# Patient Record
Sex: MALE | Race: BLACK OR AFRICAN AMERICAN | Employment: OTHER | URBAN - METROPOLITAN AREA
[De-identification: names, ages, dates, MRNs, and addresses within clinical notes are randomized per-mention and may not be internally consistent; named-entity substitution may affect disease eponyms.]

---

## 2019-02-26 ENCOUNTER — HOSPITAL ENCOUNTER (OUTPATIENT)
Dept: PREADMISSION TESTING | Age: 69
Discharge: HOME | End: 2019-02-26
Attending: INTERNAL MEDICINE
Payer: MEDICARE

## 2019-02-26 LAB
ANION GAP SERPL CALC-SCNC: 7 MMOL/L (ref 3–18)
ATRIAL RATE: 65 BPM
BUN SERPL-MCNC: 19 MG/DL (ref 7–18)
BUN/CREAT SERPL: 13 (ref 12–20)
CALCIUM SERPL-MCNC: 8.7 MG/DL (ref 8.5–10.1)
CALCULATED P AXIS, ECG09: 48 DEGREES
CALCULATED R AXIS, ECG10: 19 DEGREES
CALCULATED T AXIS, ECG11: 53 DEGREES
CHLORIDE SERPL-SCNC: 109 MMOL/L (ref 100–108)
CO2 SERPL-SCNC: 25 MMOL/L (ref 21–32)
CREAT SERPL-MCNC: 1.5 MG/DL (ref 0.6–1.3)
DIAGNOSIS, 93000: NORMAL
EST. AVERAGE GLUCOSE BLD GHB EST-MCNC: 146 MG/DL
GLUCOSE SERPL-MCNC: 168 MG/DL (ref 74–99)
HBA1C MFR BLD: 6.7 % (ref 4.2–5.6)
HCT VFR BLD AUTO: 37.2 % (ref 36–48)
HGB BLD-MCNC: 12.3 G/DL (ref 13–16)
P-R INTERVAL, ECG05: 198 MS
POTASSIUM SERPL-SCNC: 4.1 MMOL/L (ref 3.5–5.5)
Q-T INTERVAL, ECG07: 406 MS
QRS DURATION, ECG06: 98 MS
QTC CALCULATION (BEZET), ECG08: 422 MS
SODIUM SERPL-SCNC: 141 MMOL/L (ref 136–145)
VENTRICULAR RATE, ECG03: 65 BPM

## 2019-02-26 PROCEDURE — 85018 HEMOGLOBIN: CPT

## 2019-02-26 PROCEDURE — 93005 ELECTROCARDIOGRAM TRACING: CPT

## 2019-02-26 PROCEDURE — 83036 HEMOGLOBIN GLYCOSYLATED A1C: CPT

## 2019-02-26 PROCEDURE — 36415 COLL VENOUS BLD VENIPUNCTURE: CPT

## 2019-02-26 PROCEDURE — 80048 BASIC METABOLIC PNL TOTAL CA: CPT

## 2019-02-28 ENCOUNTER — HOSPITAL ENCOUNTER (OUTPATIENT)
Age: 69
Setting detail: OUTPATIENT SURGERY
Discharge: HOME | End: 2019-02-28
Attending: INTERNAL MEDICINE | Admitting: INTERNAL MEDICINE
Payer: MEDICARE

## 2019-02-28 ENCOUNTER — ANESTHESIA EVENT (OUTPATIENT)
Dept: ENDOSCOPY | Age: 69
End: 2019-02-28
Payer: MEDICARE

## 2019-02-28 ENCOUNTER — ANESTHESIA (OUTPATIENT)
Dept: ENDOSCOPY | Age: 69
End: 2019-02-28
Payer: MEDICARE

## 2019-02-28 VITALS
DIASTOLIC BLOOD PRESSURE: 87 MMHG | HEIGHT: 69 IN | BODY MASS INDEX: 37.22 KG/M2 | OXYGEN SATURATION: 100 % | SYSTOLIC BLOOD PRESSURE: 164 MMHG | TEMPERATURE: 100.2 F | HEART RATE: 81 BPM | WEIGHT: 251.31 LBS | RESPIRATION RATE: 15 BRPM

## 2019-02-28 LAB — GLUCOSE BLD STRIP.AUTO-MCNC: 152 MG/DL (ref 70–110)

## 2019-02-28 PROCEDURE — 88305 TISSUE EXAM BY PATHOLOGIST: CPT

## 2019-02-28 PROCEDURE — 77030020263 HC SOL INJ SOD CL0.9% LFCR 1000ML: Performed by: INTERNAL MEDICINE

## 2019-02-28 PROCEDURE — 76060000031 HC ANESTHESIA FIRST 0.5 HR: Performed by: INTERNAL MEDICINE

## 2019-02-28 PROCEDURE — 77030013991 HC SNR POLYP ENDOSC BSC -A: Performed by: INTERNAL MEDICINE

## 2019-02-28 PROCEDURE — 76040000019: Performed by: INTERNAL MEDICINE

## 2019-02-28 PROCEDURE — 82962 GLUCOSE BLOOD TEST: CPT

## 2019-02-28 PROCEDURE — 74011250636 HC RX REV CODE- 250/636

## 2019-02-28 PROCEDURE — 74011250636 HC RX REV CODE- 250/636: Performed by: INTERNAL MEDICINE

## 2019-02-28 PROCEDURE — 77030009426 HC FCPS BIOP ENDOSC BSC -B: Performed by: INTERNAL MEDICINE

## 2019-02-28 RX ORDER — SODIUM CHLORIDE 0.9 % (FLUSH) 0.9 %
5-40 SYRINGE (ML) INJECTION AS NEEDED
Status: CANCELLED | OUTPATIENT
Start: 2019-02-28

## 2019-02-28 RX ORDER — PROPOFOL 10 MG/ML
INJECTION, EMULSION INTRAVENOUS AS NEEDED
Status: DISCONTINUED | OUTPATIENT
Start: 2019-02-28 | End: 2019-02-28 | Stop reason: HOSPADM

## 2019-02-28 RX ORDER — SODIUM CHLORIDE 9 MG/ML
125 INJECTION, SOLUTION INTRAVENOUS CONTINUOUS
Status: DISCONTINUED | OUTPATIENT
Start: 2019-02-28 | End: 2019-02-28 | Stop reason: HOSPADM

## 2019-02-28 RX ORDER — FLUMAZENIL 0.1 MG/ML
0.2 INJECTION INTRAVENOUS
Status: CANCELLED | OUTPATIENT
Start: 2019-02-28 | End: 2019-02-28

## 2019-02-28 RX ORDER — ATROPINE SULFATE 0.1 MG/ML
0.5 INJECTION INTRAVENOUS
Status: CANCELLED | OUTPATIENT
Start: 2019-02-28 | End: 2019-03-01

## 2019-02-28 RX ORDER — SODIUM CHLORIDE 0.9 % (FLUSH) 0.9 %
5-40 SYRINGE (ML) INJECTION EVERY 8 HOURS
Status: CANCELLED | OUTPATIENT
Start: 2019-02-28

## 2019-02-28 RX ORDER — DEXTROMETHORPHAN/PSEUDOEPHED 2.5-7.5/.8
1.2 DROPS ORAL
Status: CANCELLED | OUTPATIENT
Start: 2019-02-28

## 2019-02-28 RX ORDER — EPINEPHRINE 0.1 MG/ML
1 INJECTION INTRACARDIAC; INTRAVENOUS
Status: CANCELLED | OUTPATIENT
Start: 2019-02-28 | End: 2019-03-01

## 2019-02-28 RX ORDER — NALOXONE HYDROCHLORIDE 0.4 MG/ML
0.4 INJECTION, SOLUTION INTRAMUSCULAR; INTRAVENOUS; SUBCUTANEOUS
Status: CANCELLED | OUTPATIENT
Start: 2019-02-28 | End: 2019-02-28

## 2019-02-28 RX ADMIN — PROPOFOL 20 MG: 10 INJECTION, EMULSION INTRAVENOUS at 15:45

## 2019-02-28 RX ADMIN — PROPOFOL 20 MG: 10 INJECTION, EMULSION INTRAVENOUS at 15:47

## 2019-02-28 RX ADMIN — PROPOFOL 20 MG: 10 INJECTION, EMULSION INTRAVENOUS at 15:44

## 2019-02-28 RX ADMIN — PROPOFOL 20 MG: 10 INJECTION, EMULSION INTRAVENOUS at 15:53

## 2019-02-28 RX ADMIN — PROPOFOL 20 MG: 10 INJECTION, EMULSION INTRAVENOUS at 15:48

## 2019-02-28 RX ADMIN — PROPOFOL 20 MG: 10 INJECTION, EMULSION INTRAVENOUS at 15:51

## 2019-02-28 RX ADMIN — PROPOFOL 80 MG: 10 INJECTION, EMULSION INTRAVENOUS at 15:43

## 2019-02-28 RX ADMIN — SODIUM CHLORIDE 125 ML/HR: 900 INJECTION, SOLUTION INTRAVENOUS at 15:02

## 2019-02-28 NOTE — ANESTHESIA POSTPROCEDURE EVALUATION
Post-Anesthesia Evaluation and Assessment Cardiovascular Function/Vital Signs Visit Vitals /87 Pulse 81 Temp 37.9 °C (100.2 °F) Resp 15 Ht 5' 9\" (1.753 m) Wt 114 kg (251 lb 5 oz) SpO2 100% BMI 37.11 kg/m² Patient is status post Procedure(s): 
COLONOSCOPY; POLYPECTOMY. Nausea/Vomiting: Controlled. Postoperative hydration reviewed and adequate. Pain: 
Pain Scale 1: Numeric (0 - 10) (02/28/19 1618) Pain Intensity 1: 0 (02/28/19 1618) Managed. Neurological Status: At baseline. Mental Status and Level of Consciousness: Arousable. Pulmonary Status:  
O2 Device: Room air (02/28/19 1618) Adequate oxygenation and airway patent. Complications related to anesthesia: None Post-anesthesia assessment completed. No concerns. Patient has met all discharge requirements. Signed By: Hanna Richmond MD  
 February 28, 2019

## 2019-02-28 NOTE — H&P
Gastroenterology [place] [id number] [facility]  [address] Tel: [phone] Fax: [phone] Patient:  Yusra Tovar YOB: 1950 Date:                       2019 10:50 AM  
Historian:                  self Visit Type:                 Office Visit This 77year old male presents for Colon Cancer Screening. History of Present Illness: 1. Colon Cancer Screening Prior screening:  colonoscopy and 2009 Dr. Jose Antonio Paul 0 polyps. Denies risk factors. Additional information: No family history of colon cancer, Patient has a family history of Crohn's/colitis and NSAID/ASA use. PROBLEM LIST:   Problem List reviewed. Problem Description Onset Date Chronic Clinical Status Notes BMI 45.0-49.9, adult 2016 Y    
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    
 
 
 
 
 
PAST MEDICAL/SURGICAL HISTORY   (Detailed) Disease/disorder Onset Date Management Date Comments Shunt 2009 Shunt 1984 Penial Implant Knee surgery [address] Surgery Sinus Sugery Family History  (Detailed) Relationship Family Member Name  Age at Death Condition Onset Age Cause of Death Family history of Diabetes mellitus  N Family history of Hypertension  N Father  Y 80 Cardiovascular disease  N Mother  Y 50 Cardiovascular disease  N Social History:  (Detailed) Tobacco use reviewed. The patient is right-handed. Preferred language is [place]. Language spoken at home is [place]. MARITAL STATUS/FAMILY/SOCIAL SUPPORT Currently . Tobacco use status: Ex-cigarette smoker. Smoking status: Former smoker. SMOKING STATUS Use Status Type Smoking Status Usage Per Day Years Used Total Pack Years yes Cigarette Former smoker CESSATION Type Date Quit Longest Tobacco Free Cessation Method Cigarette 01/01/1994 TOBACCO/VAPING EXPOSURE No passive smoke exposure. ALCOHOL There is no history of alcohol use. Medications (active prior to today) Medication Instructions Start Date Stop Date Refilled Elsewhere  
insulin lispro protamine-lispro 100 unit/mL (50-50) subcutaneous pen inject by subcutaneous route as per insulin sliding scale protocol // 02/04/2019  Y Nexium 40 mg capsule,delayed release take 1 capsule by oral route  every day // 02/04/2019  Y Lasix 40 mg tablet take 1 tablet by oral route  Mon, Wed & Fri // 02/04/2019  Y  
hydralazine 50 mg tablet take 1 tablet by oral route 2 times every day with food // 02/04/2019  Y Lipitor 40 mg tablet take 1 tablet by oral route  every day // 02/04/2019  Y Novolog 100 unit/mL subcutaneous solution inject by subcutaneous route 8-10 units before meals // 02/04/2019  Y  
lisinopril 40 mg tablet take 1 tablet by oral route  every day //   Y Fergon 240 mg (27 mg iron) tablet  // 02/04/2019  Y Valium 5 mg tablet take 1 tablet half hour prior to MRI and another one if needed 06/17/2016 02/04/2019  N Centrum Complete 18 mg-400 mcg tablet take 1 tablet by oral route  every day with food // 02/04/2019  Y  
iron  //   [facility] Lantus Solostar 100 unit/mL (3 mL) subcutaneous insulin pen inject by subcutaneous route as per insulin protocol //   Y  
tamsulosin 0.4 mg capsule take 1 capsule by oral route  every day 1/2 hour following the same meal each day //   Y  
spironolactone 25 mg tablet take 1/2 tablet by oral route  every day // 02/04/2019  Y  
isosorbide mononitrate ER 60 mg tablet,extended release 24 hr take 1 tablet by oral route  every day in the morning //   Y  
atorvastatin 40 mg tablet take 1 tablet by oral route  every day // 02/04/2019  Y  
hydralazine 25 mg tablet take 1 tablet by oral route 2 times every day with food // 02/04/2019  Y  
butalbital-acetaminophen-caffeine 50 mg-325 mg-40 mg tablet take 1 - 2 tablet by oral route  every 8 hours as needed not to exceed 6 tablets per 24hrs 03/01/2018 02/04/2019 03/01/2018 N  
propranolol 20 mg tablet take 1 tablet by oral route 3 times every day 04/16/2018 02/04/2019 04/16/2018 N  
gabapentin 300 mg capsule TAKE ONE CAPSULE BY MOUTH TWICE DAILY 05/10/2018  05/10/2018 N  
TOPIRAMATE 50MG     TAB TAKE 3 TABLETS BY MOUTH TWICE DAILY 01/03/2019 02/04/2019 02/04/2019 N Trokendi XR 50 mg capsule, extended release take 1 capsule by oral route  every day 02/03/2019   N Aspirin Low Dose 81 mg tablet,delayed release take 1 tablet by oral route  every day 02/03/2019   N Lantus U-100 Insulin 100 unit/mL subcutaneous solution inject by subcutaneous route as per insulin protocol 02/03/2019   N Patient Status Completed with information received for patient in a summary of care record. Medication Reconciliation Medications reconciled today. Medication Reviewed Adherence Medication Name Sig Desc Elsewhere Status  
taking as directed lisinopril 40 mg tablet take 1 tablet by oral route  every day Y Verified  
taking as directed iron  Y Verified  
taking as directed Lantus Solostar 100 unit/mL (3 mL) subcutaneous insulin pen inject by subcutaneous route as per insulin protocol Y Verified  
taking as directed tamsulosin 0.4 mg capsule take 1 capsule by oral route  every day 1/2 hour following the same meal each day Y Verified  
taking as directed isosorbide mononitrate ER 60 mg tablet,extended release 24 hr take 1 tablet by oral route  every day in the morning Y Verified  
taking as directed gabapentin 300 mg capsule TAKE ONE CAPSULE BY MOUTH TWICE DAILY N Verified  
taking as directed GaviLyte-N 420 gram oral solution take as prescribed by physician N Verified  
taking as directed Trokendi XR 50 mg capsule, extended release take 1 capsule by oral route  every day N Verified taking as directed Aspirin Low Dose 81 mg tablet,delayed release take 1 tablet by oral route  every day N Verified  
taking as directed Lantus U-100 Insulin 100 unit/mL subcutaneous solution inject by subcutaneous route as per insulin protocol N Verified Medications (Added, Continued or Stopped today) Start Date Medication Directions PRN Status PRN Reason Instruction Stop Date  
02/03/2019 Aspirin Low Dose 81 mg tablet,delayed release take 1 tablet by oral route  every day N     
 atorvastatin 40 mg tablet take 1 tablet by oral route  every day N   02/04/2019 03/01/2018 butalbital-acetaminophen-caffeine 50 mg-325 mg-40 mg tablet take 1 - 2 tablet by oral route  every 8 hours as needed not to exceed 6 tablets per 24hrs N   02/04/2019 Centrum Complete 18 mg-400 mcg tablet take 1 tablet by oral route  every day with food N   02/04/2019 Fergon 240 mg (27 mg iron) tablet  N   02/04/2019  
05/10/2018 gabapentin 300 mg capsule TAKE ONE CAPSULE BY MOUTH TWICE DAILY N     
02/04/2019 GaviLyte-N 420 gram oral solution take as prescribed by physician N     
 hydralazine 25 mg tablet take 1 tablet by oral route 2 times every day with food N   02/04/2019  
 hydralazine 50 mg tablet take 1 tablet by oral route 2 times every day with food N   02/04/2019  
 insulin lispro protamine-lispro 100 unit/mL (50-50) subcutaneous pen inject by subcutaneous route as per insulin sliding scale protocol N   02/04/2019  
 iron  N     
 isosorbide mononitrate ER 60 mg tablet,extended release 24 hr take 1 tablet by oral route  every day in the morning N Lantus Solostar 100 unit/mL (3 mL) subcutaneous insulin pen inject by subcutaneous route as per insulin protocol N     
02/03/2019 Lantus U-100 Insulin 100 unit/mL subcutaneous solution inject by subcutaneous route as per insulin protocol N Lasix 40 mg tablet take 1 tablet by oral route  Mon, Wed & Fri N   02/04/2019 Lipitor 40 mg tablet take 1 tablet by oral route  every day N   02/04/2019  
 lisinopril 40 mg tablet take 1 tablet by oral route  every day N Nexium 40 mg capsule,delayed release take 1 capsule by oral route  every day N   02/04/2019 Novolog 100 unit/mL subcutaneous solution inject by subcutaneous route 8-10 units before meals N   02/04/2019 04/16/2018 propranolol 20 mg tablet take 1 tablet by oral route 3 times every day N   02/04/2019  
 spironolactone 25 mg tablet take 1/2 tablet by oral route  every day N   02/04/2019  
 tamsulosin 0.4 mg capsule take 1 capsule by oral route  every day 1/2 hour following the same meal each day N     
01/03/2019 TOPIRAMATE 50MG     TAB TAKE 3 TABLETS BY MOUTH TWICE DAILY N   02/04/2019 02/04/2019 TOPIRAMATE 50MG     TAB take 3 Tablet by oral route 2 times every day N   02/04/2019 02/03/2019 Trokendi XR 50 mg capsule, extended release take 1 capsule by oral route  every day N     
06/17/2016 Valium 5 mg tablet take 1 tablet half hour prior to MRI and another one if needed N  Do not drive while taking medication 02/04/2019 Allergies: 
Ingredient Reaction (Severity) Medication Name Comment NO KNOWN ALLERGIES     
 
ORDERS: 
Status Lab Order Time Frame Comments  
ordered GASTROENTEROLOGY PROCEDURE within 1 Month at location [address] surgeon scheduled is Mehreen Jimenez MD. An assistant has not been requested. gavilyte. Review of Systems System Neg/Pos Details Constitutional Negative Chills, Fever, Malaise and Weight loss. ENMT Positive Sinus Infection. ENMT Negative Nasal congestion and Sore throat. Eyes Negative Double vision. Respiratory Negative Asthma, Chronic cough and Wheezing. Cardio Negative Chest pain, Edema and Irregular heartbeat/palpitations. GI Positive Change in bowel habits, Constipation.   
GI Negative Abdominal pain, Decreased appetite, Diarrhea, Dysphagia, Heartburn, Hematemesis, Hematochezia, Melena, Nausea, Reflux and Vomiting.  Negative Dysuria and Hematuria. Endocrine Negative Cold intolerance, Heat intolerance and Increased thirst.  
Neuro Negative Dizziness, Headache, Numbness, Tremors and Vertigo. Psych Negative Anxiety, Depression and Increased stress. Integumentary Negative Hives and Rash. MS Positive Back pain. MS Negative Joint pain and Myalgia. Hema/Lymph Negative Easy bleeding and Lymphadenopathy. Allergic/Immuno Negative Food allergies and Seasonal allergies. Vital Signs Height Time ft in cm Last Measured Height Position 11:46 AM 5.0 9.00 175.26 02/04/2019 0 Weight/BSA/BMI Time lb oz kg Context BMI kg/m2 BSA m2  
11:46 .00  115.212 dressed with shoes 37.51 Blood Pressure Time BP mm/Hg Position Side Site Method Cuff Size 11:46 /66 sitting right arm automatic adult large Temperature/Pulse/Respiration Time Temp F Temp C Temp Site Pulse/min Pattern Resp/ min 11:46 AM    74 regular 20 Measured By Time Measured by  
11:46 AM [facility] PHYSICAL EXAM: 
Exam Findings Details Constitutional Normal Well developed. Eyes Normal Conjunctiva - Right: Normal, Left: Normal. Sclera - Right: Normal, Left: Normal.  
Nasopharynx Normal Lips/teeth/gums - Normal. Buccal mucosa - Normal.  
Neck Exam Normal Inspection - Normal. Palpation - Normal. Thyroid gland - Normal.  
Respiratory Normal Inspection - Normal. Auscultation - Normal.  
Cardiovascular Normal Regular rate and rhythm. No murmurs, gallops, or rubs. Vascular Normal Pulses - Radial: Normal, Brachial: Normal, Dorsalis pedis: Normal, Posterior tibial: Normal.  
Abdomen Normal Inspection - Normal. Appliance(s) - None. Abdominal muscles - Normal. Auscultation - Normal. Percussion - Normal. Anterior palpation - Normal, No guarding. Umbilicus - Normal. No abdominal tenderness.  No hepatic enlargement. No spleen enlargement. No hernia. No ascites. Ahuja's sign - Negative. No hepatic tenderness. No hepatic bruit. Skin Normal Inspection - Normal.  
Musculoskeletal Normal Hands/Wrist - Right: Normal, Left: Normal.  
Extremity Normal No edema. Neurological Normal Fine motor skills - Normal.  
Psychiatric Normal Orientation - Oriented to time, place, person & situation. Appropriate mood and affect. Assessment/Plan # Detail Type Description 1. Assessment Personal history of colonic polyps (Z86.010). Patient Plan 77year old male patient of Dr. Danielle Cabrera for colonoscopy. Last colonoscopy completed 2009 by Dr. Negro Amezquita and pathology revealed no polyps. BM once daily. Normal color, soft, formed in consistency. No evidence of blood or mucus, changes in bowel pattern issues. Pt reports constipation that is relieved with Linzess as needed. Patient reports seasonal allergies or herbal consumption. Medical hx includes hx of CVA, TIA, heart murmur, HTN, BPH, hyperlipidemia. DMII. No significant  pulmonary, GI,  musculoskeletal,  issues. Surgical hx remarkable gastric bypass. No family history of colorectal CA. Denies tobacco and reports rare ETOH use. No significant weight gains or losses in the last 3-6 months. No heat or cold intolerances. Patient states  no N/V/D, fever, chills, sick contacts, SOB, abdominal or chest pains. No dysphagia, appetite is good which consists of 3 meals per day. PLAN: Colonoscopy Scheduled He has average risk for colon cancer and asymptomatic. He would be having his screening colonoscopy. I explained to him the procedure of colonoscopy and the risks involved which include but not limited to reaction to sedation, bleeding, perforation, infection or missing a lesion if bowels are not well prepped or are unusually tortuous. He agreed to proceed with the procedure and answered his questions. thoroughly.   I gave him the Gavilyte to clean his bowels. I advised him to take if needed, extra laxatives for few days before in the event he is on the constipated side to assure adequate bowel prep. Told him not take his medications in the morning of the procedure because they would be flushed out with the prep but he can take them more confidently after the procedure. I advised him to bring all his medication with him. I advised pt to not to take hypoglycemic agents/ Insuline the morning of the procedure to avoid hypoglycemic reaction because fasting is needed and check BS at least twice prior to procedure. Plan Orders He will be scheduled for GASTROENTEROLOGY PROCEDURE, Next Lab Date is within 1 Month on 02/28/2019. Patient Education # Patient Education 1. Colon Polyps: Care Instructions Active Patient Care Team Members Name Contact Agency Type Support Role Relationship Active Date Inactive Date Specialty Froylan Gillespie    Spouse Froylan Gillespie   Emergency Contact Spouse Dawson Bravo   encounter provider Quita Weeks   Patient provider PCP Juany Toledo   encounter provider    Neurology Provider:  
Sonia Kelley  02/04/2019 3:18 PM  
Document generated by: Kaye Kovacs 02/04/2019 Electronically signed by Kaye Kovacs [id number]-BC on 02/04/2019 03:18 PM 
 
I have seen and examined the patient.

## 2019-02-28 NOTE — PROCEDURES
Colonoscopy Procedure Note    Indications: colon cancer screening    Anesthesia/Sedation: MAC anesthesia Propofol    Pre-Procedure Exam:  Airway: clear   Heart: normal S1and S2    Lungs: clear bilateral  Abdomen: soft, nontender, bowel sounds present and normal in all quadrants   Mental Status: awake, alert, and oriented to person, place, and time      Procedure in Detail:  Informed consent was obtained for the procedure, including sedation. Risks of perforation, hemorrhage, adverse drug reaction, and aspiration were discussed. The patient was placed in the left lateral decubitus position. Based on the pre-procedure assessment, including review of the patient's medical history, medications, allergies, and review of systems, he had been deemed to be an appropriate candidate for moderate sedation; he was therefore sedated with the medications listed above. The patient was monitored continuously with ECG tracing, pulse oximetry, blood pressure monitoring, and direct observations. A rectal examination was performed. The [id number] was inserted into the rectum and advanced under direct vision to the cecum, which was identified by the appendiceal orifice. The quality of the colonic preparation was good. A careful inspection was made as the colonoscope was withdrawn, including a retroflexed view of the rectum; findings and interventions are described below. Appropriate photodocumentation was obtained. ANUS: Anal exam reveals no masses. Small internal and external hemorrhoids. RECTUM: Rectal exam reveals no masses. SIGMOID COLON: The mucosa is normal with good vascular pattern and without ulcers and polyps. Diverticulosis -mild  DESCENDING COLON: The mucosa is normal with good vascular pattern and without ulcers and polyps. Diverticulosis -mild  SPLENIC FLEXURE: The splenic flexure is normal.   TRANSVERSE COLON: The mucosa is normal with good vascular pattern and without ulcers, diverticula.    Small 5mm polyp removed with cold snare. HEPATIC FLEXURE: The hepatic flexure is normal.   ASCENDING COLON: The mucosa is normal with good vascular pattern and without ulcers, diverticula, and polyps. CECUM: The appendiceal orifice appears normal. The ileocecal valve appears normal. Single diverticula   TERMINAL ILEUM: The terminal ileum was not entered. Specimens: No specimens were collected. EBL: None    Withdrawal Time: 13 min    Complications: None; patient tolerated the procedure well. Attending Attestation: I performed the procedure. Recommendations:   - Await pathology.     Signed By: Mary Armijo MD                      2/28/2019

## 2019-02-28 NOTE — ANESTHESIA PREPROCEDURE EVALUATION
Anesthetic History Pertinent negatives: No PONV Review of Systems / Medical History Patient summary reviewed, nursing notes reviewed and pertinent labs reviewed Pulmonary Sleep apnea (non-compliant with CPAP): No treatment Pertinent negatives: No smoker (former smoker, quit 2007) Neuro/Psych  
 
 
CVA (2009; balance problems and speech, resolved with time and physical and speech therapy, no residual deficits): no residual symptoms TIA (2012; no focal symptoms, just acting wierd, resolved with ASA in the ER, no recurrence) Cardiovascular Hypertension (did not take meds today): well controlled Pertinent negatives: No past MI, dysrhythmias, angina and CHF 
 
  
GI/Hepatic/Renal 
  
GERD (rare gerd, PRN tums) Pertinent negatives: No liver disease and renal disease Endo/Other Diabetes (glucose 152 pre-procedure): well controlled, type 2 Obesity Other Findings Physical Exam 
 
Airway Mallampati: III 
TM Distance: < 4 cm Neck ROM: decreased range of motion Mouth opening: Normal 
 
 Cardiovascular Rate: normal 
 
 
 
 Dental 
No notable dental hx Pulmonary Breath sounds clear to auscultation Abdominal 
GI exam deferred Other Findings Anesthetic Plan ASA: 3 Anesthesia type: MAC Anesthetic plan and risks discussed with: Patient Plan MAC. Pt with low grade fever and sinus drainage. Denies cough productive of brown/green sputum, myalgias. No wheezing. Informed patient it is not ideal conditions for elective procedure, but no definite contraindications to proceeding. Pt acknowledges increased risk but wants to proceed and accepts all risks.

## 2019-02-28 NOTE — PERIOP NOTES
Reviewed PTA medication list with patient/caregiver and patient/caregiver denies any additional medications. Patient admits to having a responsible adult care for them for at least 24 hours after surgery. 
  
Temp tymp- 101.2 -oral temp 99.6 . Doctor Regla Miguel is aware. No new order given. Non productive cough. 1600 pm - advised to take palin tylenol for temp. and increased fluid intake. Pt/ spouse agreed. Escorted via w/c. No distress.

## 2019-02-28 NOTE — DISCHARGE INSTRUCTIONS
Mini Ortega  [id number]  1950    COLON DISCHARGE INSTRUCTIONS    Discomfort:  Redness at IV site- apply warm compress to area; if redness or soreness persist- contact your physician  There may be a slight amount of blood passed from the rectum  Gaseous discomfort- walking, belching will help relieve any discomfort  You should not operate a vehicle for 12 hours  You should not engage in an occupation involving machinery or appliances for rest of today  You may not drink alcoholic beverages for at least 12 hours  Avoid making any critical decisions for at least 24 hour  DIET:   Regular diet. - however -  remember your colon is empty and a heavy meal will produce gas. Avoid these foods:  vegetables, fried / greasy foods, carbonated drinks for today     ACTIVITY:  You may resume your normal daily activities it is recommended that you spend the remainder of the day resting -  avoid any strenuous activity. CALL M.D. ANY SIGN OF:   Increasing pain, nausea, vomiting  Abdominal distension (swelling)  New increased bleeding (oral or rectal)  Fever (chills)  Pain in chest area  Bloody discharge from nose or mouth  Shortness of breath      Follow-up Instructions:  Awaiting path                          Mini Ortega  [id number]  1950        DISCHARGE SUMMARY from Nurse    The following personal items collected during your admission are returned to you:   Dental Appliance: Dental Appliances: None  Vision: Visual Aid: None  Hearing Aid:    Jewelry:    Clothing:    Other Valuables:    Valuables sent to safe:      {Medication reconciliation information is now added to the patient's AVS automatically when it is printed. There is no need to use this SmartLink in discharge instructions.   Highlight this text and delete it to clear this message}    Patient {ARMBANDS:[id number]}    DISCHARGE SUMMARY from Nurse    PATIENT INSTRUCTIONS:    After general anesthesia or intravenous sedation, for 24 hours or while taking prescription Narcotics:  · Limit your activities  · Do not drive and operate hazardous machinery  · Do not make important personal or business decisions  · Do  not drink alcoholic beverages  · If you have not urinated within 8 hours after discharge, please contact your surgeon on call. Report the following to your surgeon:  · Excessive pain, swelling, redness or odor of or around the surgical area  · Temperature over 100.5  · Nausea and vomiting lasting longer than 4 hours or if unable to take medications  · Any signs of decreased circulation or nerve impairment to extremity: change in color, persistent  numbness, tingling, coldness or increase pain  · Any questions    What to do at Home:  Recommended activity: as above    If you experience any of the following symptoms as above, please follow up with Doctor Saw Grossman. *  Please give a list of your current medications to your Primary Care Provider. *  Please update this list whenever your medications are discontinued, doses are      changed, or new medications (including over-the-counter products) are added. *  Please carry medication information at all times in case of emergency situations. These are general instructions for a healthy lifestyle:    No smoking/ No tobacco products/ Avoid exposure to second hand smoke  Surgeon General's Warning:  Quitting smoking now greatly reduces serious risk to your health. Obesity, smoking, and sedentary lifestyle greatly increases your risk for illness    A healthy diet, regular physical exercise & weight monitoring are important for maintaining a healthy lifestyle    You may be retaining fluid if you have a history of heart failure or if you experience any of the following symptoms:  Weight gain of 3 pounds or more overnight or 5 pounds in a week, increased swelling in our hands or feet or shortness of breath while lying flat in bed.   Please call your doctor as soon as you notice any of these symptoms; do not wait until your next office visit. Recognize signs and symptoms of STROKE:    F-face looks uneven    A-arms unable to move or move unevenly    S-speech slurred or non-existent    T-time-call 911 as soon as signs and symptoms begin-DO NOT go       Back to bed or wait to see if you get better-TIME IS BRAIN. Warning Signs of HEART ATTACK     Call 911 if you have these symptoms:   Chest discomfort. Most heart attacks involve discomfort in the center of the chest that lasts more than a few minutes, or that goes away and comes back. It can feel like uncomfortable pressure, squeezing, fullness, or pain.  Discomfort in other areas of the upper body. Symptoms can include pain or discomfort in one or both arms, the back, neck, jaw, or stomach.  Shortness of breath with or without chest discomfort.  Other signs may include breaking out in a cold sweat, nausea, or lightheadedness. Don't wait more than five minutes to call 911 - MINUTES MATTER! Fast action can save your life. Calling 911 is almost always the fastest way to get lifesaving treatment. Emergency Medical Services staff can begin treatment when they arrive -- up to an hour sooner than if someone gets to the hospital by car. The discharge information has been reviewed with the patient and spouse. The patient and spouse verbalized understanding. Discharge medications reviewed with the patient and spouse and appropriate educational materials and side effects teaching were provided.   ___________________________________________________________________________________________________________________________________

## 2021-01-26 ENCOUNTER — TRANSCRIBE ORDER (OUTPATIENT)
Dept: SCHEDULING | Age: 71
End: 2021-01-26

## 2021-01-26 DIAGNOSIS — M54.50 LOW BACK PAIN: Primary | ICD-10-CM

## 2021-02-09 ENCOUNTER — APPOINTMENT (OUTPATIENT)
Dept: CT IMAGING | Age: 71
End: 2021-02-09
Attending: ORTHOPAEDIC SURGERY

## 2021-02-09 ENCOUNTER — HOSPITAL ENCOUNTER (OUTPATIENT)
Dept: GENERAL RADIOLOGY | Age: 71
Discharge: HOME | End: 2021-02-09
Attending: ORTHOPAEDIC SURGERY | Admitting: ORTHOPAEDIC SURGERY

## 2021-02-09 ENCOUNTER — HOSPITAL ENCOUNTER (OUTPATIENT)
Dept: CT IMAGING | Age: 71
End: 2021-02-09
Attending: ORTHOPAEDIC SURGERY

## 2021-02-09 VITALS
BODY MASS INDEX: 37.18 KG/M2 | TEMPERATURE: 99.1 F | HEART RATE: 73 BPM | RESPIRATION RATE: 16 BRPM | HEIGHT: 69 IN | DIASTOLIC BLOOD PRESSURE: 76 MMHG | SYSTOLIC BLOOD PRESSURE: 152 MMHG | WEIGHT: 251 LBS | OXYGEN SATURATION: 98 %

## 2021-02-09 DIAGNOSIS — M54.50 LOW BACK PAIN: ICD-10-CM

## 2021-02-09 RX ORDER — ATORVASTATIN CALCIUM 80 MG/1
80 TABLET, FILM COATED ORAL DAILY
COMMUNITY

## 2021-02-09 RX ORDER — INSULIN ASPART 100 [IU]/ML
14 INJECTION, SUSPENSION SUBCUTANEOUS 2 TIMES DAILY
COMMUNITY

## 2021-02-09 RX ORDER — CYCLOBENZAPRINE HCL 5 MG
5 TABLET ORAL
COMMUNITY

## 2021-02-09 RX ORDER — CLOPIDOGREL BISULFATE 75 MG/1
75 TABLET ORAL DAILY
COMMUNITY

## 2021-02-09 RX ORDER — METFORMIN HYDROCHLORIDE 1000 MG/1
1000 TABLET ORAL 2 TIMES DAILY WITH MEALS
COMMUNITY

## 2021-02-09 RX ORDER — LIDOCAINE HYDROCHLORIDE 10 MG/ML
1-10 INJECTION, SOLUTION EPIDURAL; INFILTRATION; INTRACAUDAL; PERINEURAL
Status: DISCONTINUED | OUTPATIENT
Start: 2021-02-09 | End: 2021-02-09 | Stop reason: HOSPADM

## 2021-12-23 ENCOUNTER — TRANSCRIBE ORDER (OUTPATIENT)
Dept: SCHEDULING | Age: 71
End: 2021-12-23

## 2021-12-23 DIAGNOSIS — M54.50 LUMBAGO: Primary | ICD-10-CM

## 2022-01-12 ENCOUNTER — HOSPITAL ENCOUNTER (OUTPATIENT)
Dept: GENERAL RADIOLOGY | Age: 72
Discharge: HOME | End: 2022-01-12
Attending: ORTHOPAEDIC SURGERY | Admitting: RADIOLOGY
Payer: MEDICARE

## 2022-01-12 ENCOUNTER — HOSPITAL ENCOUNTER (OUTPATIENT)
Dept: CT IMAGING | Age: 72
Discharge: HOME | End: 2022-01-12
Attending: ORTHOPAEDIC SURGERY
Payer: MEDICARE

## 2022-01-12 VITALS
HEIGHT: 69 IN | DIASTOLIC BLOOD PRESSURE: 80 MMHG | SYSTOLIC BLOOD PRESSURE: 161 MMHG | TEMPERATURE: 97.8 F | HEART RATE: 62 BPM | RESPIRATION RATE: 20 BRPM | OXYGEN SATURATION: 99 % | BODY MASS INDEX: 36.43 KG/M2 | WEIGHT: 246 LBS

## 2022-01-12 DIAGNOSIS — M54.50 LUMBAGO: ICD-10-CM

## 2022-01-12 PROCEDURE — 62304 MYELOGRAPHY LUMBAR INJECTION: CPT

## 2022-01-12 PROCEDURE — 74011250637 HC RX REV CODE- 250/637: Performed by: PHYSICIAN ASSISTANT

## 2022-01-12 PROCEDURE — 72132 CT LUMBAR SPINE W/DYE: CPT

## 2022-01-12 PROCEDURE — 74011000636 HC RX REV CODE- 636: Performed by: ORTHOPAEDIC SURGERY

## 2022-01-12 RX ORDER — LIDOCAINE HYDROCHLORIDE 10 MG/ML
1-5 INJECTION, SOLUTION EPIDURAL; INFILTRATION; INTRACAUDAL; PERINEURAL
Status: COMPLETED | OUTPATIENT
Start: 2022-01-12 | End: 2022-01-12

## 2022-01-12 RX ORDER — ACETAMINOPHEN 325 MG/1
650 TABLET ORAL
Status: DISCONTINUED | OUTPATIENT
Start: 2022-01-12 | End: 2022-01-12 | Stop reason: HOSPADM

## 2022-01-12 RX ORDER — OXYCODONE AND ACETAMINOPHEN 5; 325 MG/1; MG/1
1-2 TABLET ORAL
Status: DISCONTINUED | OUTPATIENT
Start: 2022-01-12 | End: 2022-01-12 | Stop reason: HOSPADM

## 2022-01-12 RX ADMIN — OXYCODONE AND ACETAMINOPHEN 2 TABLET: 5; 325 TABLET ORAL at 10:42

## 2022-01-12 RX ADMIN — IOPAMIDOL 10 ML: 408 INJECTION, SOLUTION INTRATHECAL at 10:01

## 2022-01-12 RX ADMIN — LIDOCAINE HYDROCHLORIDE 5 ML: 10 INJECTION, SOLUTION EPIDURAL; INFILTRATION; INTRACAUDAL; PERINEURAL at 10:02

## 2022-01-12 NOTE — PROGRESS NOTES
Pt is all prepped and ready for procedure. 1000 Pt back to care unit. Myelogram tolerated well. Band-aid to lower back dry and intact. 1040 Medicated with two percocets for lower back pain 5/10    1130 Back pain resolved. 1300 Discharge instructions reviewed with pt and spouse and they verbalized all understandings. ,     1330 Pt escorted to car and left with spouse in stable condition

## 2022-01-12 NOTE — DISCHARGE INSTRUCTIONS
Patient Education        Myelogram: About This Test  What is it? A myelogram uses X-rays and a special dye to make pictures of bones and nerves of the spine (spinal canal). The spinal canal holds the spinal cord, the spinal nerve roots, and the fluid-filled space between the bones in your spine. Why is this test done? A myelogram is done to check for:  · The cause of arm or leg numbness, weakness, or pain. · Narrowing of the spinal canal (spinal stenosis). · A tumor or infection causing problems with the spinal cord or nerve roots. · A spinal disc that has ruptured (herniated disc). · Inflammation of the membrane that covers the brain and spinal cord. · Problems with the blood vessels to the spine. This test may help find the cause of pain that can't be found by other tests, such as an MRI or a CT scan. How do you prepare for the test?  Your doctor will tell you if you need to change how much you eat and drink before the myelogram. You may be asked to increase the amount of water you drink before the test. Follow the instructions your doctor gives you about eating and drinking. If you take aspirin or some other blood thinner, ask your doctor if you should stop taking it before your test. Make sure that you understand exactly what your doctor wants you to do. These medicines increase the risk of bleeding. Be sure you have someone to take you home. Anesthesia and pain medicine will make it unsafe for you to drive or get home on your own. How is the test done? The dye is put into your spinal canal with a thin needle. This is called a lumbar puncture. The dye moves through the space so the nerve roots and spinal cord can be seen more clearly. After the dye is put in, you will lie still while the X-ray pictures are taken. How does it feel? You will feel a quick sting from a small needle that has medicine to numb the skin on your back.  You will also feel some pressure as the long, thin spinal needle is put into your spinal canal. You may feel a quick, sharp pain down your buttock or leg when the needle is moved in your spine. You may find it hard to lie on your stomach or side during this test.  The dye may make you feel warm and flushed and have a metallic taste in your mouth. Some people feel sick to their stomach or have a headache. Tell your doctor how you are feeling. How long does the test take? · A myelogram usually takes 30 minutes to 1 hour. What happens after the test?  · You will probably be able to go home 30 minutes to 2 hours after the test.  · You may need to lie in bed with your head raised for 4 to 24 hours after the test. To prevent seizures, which are a rare side effect, do not bend over or lie down with your head lower than your body. Keeping your head higher than your body after a myelogram also may help prevent or reduce other side effects, such as headache, nausea, or vomiting. · Avoid strenuous activity, such as running or heavy lifting, for at least 1 day after your myelogram.  · Drink plenty of water after the myelogram. Your doctor will give you instructions on taking your regular medicines. When should you call for help? Call 911 anytime you think you may need emergency care. For example, call if:  · You have a seizure. Call your doctor now or seek immediate medical care if:  · You have any increase in pain, weakness, or numbness in your legs. · You have a severe headache or stiff neck, or your eyes become very sensitive to light. · You have a headache that lasts longer than 24 hours. · You have problems urinating or having a bowel movement. · You have a fever. Follow-up care is a key part of your treatment and safety. Be sure to make and go to all appointments, and call your doctor if you are having problems. It's also a good idea to keep a list of the medicines you take. Ask your doctor when you can expect to have your test results. Where can you learn more?   Go to http://www.[employer].com/  Enter [id number] in the search box to learn more about \"Myelogram: About This Test.\"  Current as of: June 17, 2021               Content Version: 13.0  © 2006-2021 [employer]. Care instructions adapted under license by [employer] (which disclaims liability or warranty for this information). If you have questions about a medical condition or this instruction, always ask your healthcare professional. [facility] any warranty or liability for your use of this information. DISCHARGE SUMMARY from Nurse    PATIENT INSTRUCTIONS:    After general anesthesia or intravenous sedation, for 24 hours or while taking prescription Narcotics:  · Limit your activities  · Do not drive and operate hazardous machinery  · Do not make important personal or business decisions  · Do  not drink alcoholic beverages  · If you have not urinated within 8 hours after discharge, please contact your surgeon on call. Report the following to your surgeon:  · Excessive pain, swelling, redness or odor of or around the surgical area  · Temperature over 100.5  · Nausea and vomiting lasting longer than 4 hours or if unable to take medications  · Any signs of decreased circulation or nerve impairment to extremity: change in color, persistent  numbness, tingling, coldness or increase pain  · Any questions    What to do at Home:  Recommended activity: No lifting, Driving, or Strenuous exercise for 48 hours. *  Please give a list of your current medications to your Primary Care Provider. *  Please update this list whenever your medications are discontinued, doses are      changed, or new medications (including over-the-counter products) are added. *  Please carry medication information at all times in case of emergency situations.     These are general instructions for a healthy lifestyle:    No smoking/ No tobacco products/ Avoid exposure to second hand smoke  Surgeon General's Warning:  Quitting smoking now greatly reduces serious risk to your health. Obesity, smoking, and sedentary lifestyle greatly increases your risk for illness    A healthy diet, regular physical exercise & weight monitoring are important for maintaining a healthy lifestyle    You may be retaining fluid if you have a history of heart failure or if you experience any of the following symptoms:  Weight gain of 3 pounds or more overnight or 5 pounds in a week, increased swelling in our hands or feet or shortness of breath while lying flat in bed. Please call your doctor as soon as you notice any of these symptoms; do not wait until your next office visit. The discharge information has been reviewed with the patient and spouse. The patient and spouse verbalized understanding. Discharge medications reviewed with the patient and spouse and appropriate educational materials and side effects teaching were provided.       Patient armband removed and shredded    ___________________________________________________________________________________________________________________________________

## 2024-06-28 NOTE — PRE-PROCEDURE INSTRUCTIONS
Spoke to patient to schedule appointment.  Instructed to arrive at 9:15 for 10:15 appointment.  Instructed no need to be NPO at midnight.   Pt uses baby Aspirin and headache medicine, instructed to hold for 5 days.   Medications and allergies reviewed.  Opportunity for questions provided.      Reminded to disregard automated texts.

## 2024-07-11 ENCOUNTER — APPOINTMENT (OUTPATIENT)
Facility: HOSPITAL | Age: 74
End: 2024-07-11
Attending: ORTHOPAEDIC SURGERY
Payer: MEDICARE

## 2024-07-11 ENCOUNTER — HOSPITAL ENCOUNTER (OUTPATIENT)
Facility: HOSPITAL | Age: 74
Discharge: HOME | End: 2024-07-11
Attending: ORTHOPAEDIC SURGERY | Admitting: RADIOLOGY
Payer: MEDICARE

## 2024-07-11 VITALS
WEIGHT: 243 LBS | DIASTOLIC BLOOD PRESSURE: 72 MMHG | OXYGEN SATURATION: 100 % | TEMPERATURE: 98.5 F | SYSTOLIC BLOOD PRESSURE: 160 MMHG | HEART RATE: 62 BPM | RESPIRATION RATE: 20 BRPM | HEIGHT: 69 IN | BODY MASS INDEX: 35.99 KG/M2

## 2024-07-11 DIAGNOSIS — M54.50 LOW BACK PAIN, UNSPECIFIED BACK PAIN LATERALITY, UNSPECIFIED CHRONICITY, UNSPECIFIED WHETHER SCIATICA PRESENT: ICD-10-CM

## 2024-07-11 PROCEDURE — 6360000004 HC RX CONTRAST MEDICATION

## 2024-07-11 PROCEDURE — 62304 MYELOGRAPHY LUMBAR INJECTION: CPT

## 2024-07-11 PROCEDURE — 2500000003 HC RX 250 WO HCPCS

## 2024-07-11 PROCEDURE — 72132 CT LUMBAR SPINE W/DYE: CPT

## 2024-07-11 RX ORDER — IOPAMIDOL 408 MG/ML
15 INJECTION, SOLUTION INTRATHECAL
Status: COMPLETED | OUTPATIENT
Start: 2024-07-11 | End: 2024-07-11

## 2024-07-11 RX ORDER — LIDOCAINE HYDROCHLORIDE 10 MG/ML
9 INJECTION, SOLUTION EPIDURAL; INFILTRATION; INTRACAUDAL; PERINEURAL ONCE
Status: COMPLETED | OUTPATIENT
Start: 2024-07-11 | End: 2024-07-11

## 2024-07-11 RX ORDER — CLONIDINE HYDROCHLORIDE 0.1 MG/1
0.1 TABLET ORAL NIGHTLY
COMMUNITY

## 2024-07-11 RX ADMIN — LIDOCAINE HYDROCHLORIDE 9 ML: 10 INJECTION, SOLUTION EPIDURAL; INFILTRATION; INTRACAUDAL; PERINEURAL at 11:17

## 2024-07-11 RX ADMIN — IOPAMIDOL 15 ML: 408 INJECTION, SOLUTION INTRATHECAL at 11:16

## 2024-07-11 NOTE — PROGRESS NOTES
Pt is all prepped and ready for procedure.    1020 Pt picked up for procedure.    1120 Pt back to care unit. Procedure tolerated well. Band-aid lower back dry and intact.    1315 Discharge instructions reviewed with patient he verbalized all understandings.    1330 Pt escorted out via W/C and left with spouse in stable condition

## 2024-07-11 NOTE — DISCHARGE INSTRUCTIONS
confused and are having trouble thinking clearly.     Your symptoms are getting worse.   Watch closely for changes in your health, and be sure to contact your doctor if:    You do not get better as expected.   Where can you learn more?  Go to https://www.[employer].net/patientEd and enter G150 to learn more about \"General Discharge: Care Instructions.\"  Current as of: July 10, 2023  Content Version: 14.1  © 2006-2024 [employer].   Care instructions adapted under license by [employer]. If you have questions about a medical condition or this instruction, always ask your healthcare professional. [employer] disclaims any warranty or liability for your use of this information.    Patient armband removed and shredded

## 2024-07-31 ENCOUNTER — HOSPITAL ENCOUNTER (OUTPATIENT)
Facility: HOSPITAL | Age: 74
Discharge: HOME | End: 2024-08-03
Payer: MEDICARE

## 2024-07-31 DIAGNOSIS — Z01.818 PRE-OP TESTING: Primary | ICD-10-CM

## 2024-07-31 LAB
ALBUMIN SERPL-MCNC: 3.4 G/DL (ref 3.4–5)
ALBUMIN/GLOB SERPL: 1 (ref 0.8–1.7)
ALP SERPL-CCNC: 111 U/L (ref 45–117)
ALT SERPL-CCNC: 17 U/L (ref 16–61)
ANION GAP SERPL CALC-SCNC: 7 MMOL/L (ref 3–18)
AST SERPL-CCNC: 10 U/L (ref 10–38)
BILIRUB SERPL-MCNC: 0.3 MG/DL (ref 0.2–1)
BUN SERPL-MCNC: 16 MG/DL (ref 7–18)
BUN/CREAT SERPL: 10 (ref 12–20)
CALCIUM SERPL-MCNC: 8.8 MG/DL (ref 8.5–10.1)
CHLORIDE SERPL-SCNC: 112 MMOL/L (ref 100–111)
CO2 SERPL-SCNC: 24 MMOL/L (ref 21–32)
CREAT SERPL-MCNC: 1.63 MG/DL (ref 0.6–1.3)
ERYTHROCYTE [DISTWIDTH] IN BLOOD BY AUTOMATED COUNT: 13.2 % (ref 11.6–14.5)
EST. AVERAGE GLUCOSE BLD GHB EST-MCNC: 146 MG/DL
GLOBULIN SER CALC-MCNC: 3.4 G/DL (ref 2–4)
GLUCOSE SERPL-MCNC: 119 MG/DL (ref 74–99)
HBA1C MFR BLD: 6.7 % (ref 4.2–5.6)
HCT VFR BLD AUTO: 40 % (ref 36–48)
HGB BLD-MCNC: 13 G/DL (ref 13–16)
MCH RBC QN AUTO: 30.1 PG (ref 24–34)
MCHC RBC AUTO-ENTMCNC: 32.5 G/DL (ref 31–37)
MCV RBC AUTO: 92.6 FL (ref 78–100)
NRBC # BLD: 0 K/UL (ref 0–0.01)
NRBC BLD-RTO: 0 PER 100 WBC
PLATELET # BLD AUTO: 279 K/UL (ref 135–420)
PMV BLD AUTO: 9.6 FL (ref 9.2–11.8)
POTASSIUM SERPL-SCNC: 4.4 MMOL/L (ref 3.5–5.5)
PROT SERPL-MCNC: 6.8 G/DL (ref 6.4–8.2)
RBC # BLD AUTO: 4.32 M/UL (ref 4.35–5.65)
SODIUM SERPL-SCNC: 143 MMOL/L (ref 136–145)
WBC # BLD AUTO: 7.4 K/UL (ref 4.6–13.2)

## 2024-07-31 PROCEDURE — 93005 ELECTROCARDIOGRAM TRACING: CPT | Performed by: ORTHOPAEDIC SURGERY

## 2024-07-31 PROCEDURE — 85027 COMPLETE CBC AUTOMATED: CPT

## 2024-07-31 PROCEDURE — 83036 HEMOGLOBIN GLYCOSYLATED A1C: CPT

## 2024-07-31 PROCEDURE — 80053 COMPREHEN METABOLIC PANEL: CPT

## 2024-08-01 LAB
EKG ATRIAL RATE: 67 BPM
EKG DIAGNOSIS: NORMAL
EKG P AXIS: 60 DEGREES
EKG P-R INTERVAL: 222 MS
EKG Q-T INTERVAL: 402 MS
EKG QRS DURATION: 90 MS
EKG QTC CALCULATION (BAZETT): 424 MS
EKG R AXIS: 41 DEGREES
EKG T AXIS: 59 DEGREES
EKG VENTRICULAR RATE: 67 BPM

## 2024-08-02 LAB
BACTERIA SPEC CULT: NORMAL
BACTERIA SPEC CULT: NORMAL
SERVICE CMNT-IMP: NORMAL

## 2024-08-12 PROBLEM — M51.369 DDD (DEGENERATIVE DISC DISEASE), LUMBAR: Status: ACTIVE | Noted: 2024-08-12

## 2024-08-12 PROBLEM — M51.26 HNP (HERNIATED NUCLEUS PULPOSUS), LUMBAR: Status: ACTIVE | Noted: 2024-08-12

## 2024-08-12 PROBLEM — M48.062 SPINAL STENOSIS OF LUMBAR REGION WITH NEUROGENIC CLAUDICATION: Status: ACTIVE | Noted: 2024-08-12

## 2024-08-20 PROBLEM — M48.062 SPINAL STENOSIS, LUMBAR REGION WITH NEUROGENIC CLAUDICATION: Status: ACTIVE | Noted: 2024-08-20

## 2024-09-27 NOTE — H&P
Patient Name:  GAYLE LANDON    YOB: 1950    Chief Complaint:  Lower back pain, spinal stenosis, and degenerative disc disease.    History of Chief Complaint:  Mr. Landon  is a 73 y.o male has been seen previously for chronic lower back pain with flat back syndrome and L5-S1 spinal stenosis. He does undergo corticosteroid injections occasionally to manage his lower back pain, but he is getting to the point where he is tired of getting the cortisone injections. He does not really want to continue that for any length of time. He feels he needs to start caring more for his wife. He would like to walk longer without the cane. He states he can only walk a few yards at a time now because of his back pain. He occasionally has pain into his left thigh with numbness and pain in his left calf and occasionally across the left hip and pelvis. He did have a CT myelogram of the lumbar spine in January 2022 at [facility] which revealed L2-3 and L5-S1 spinal stenosis. It appears he had the CT myelogram due to having a  shunt placed for a brain aneurysm in 1984. He also has a history of cardiac stents.  He says his back is getting worse. He is doing the physical therapy program. He has had multiple epidural steroid injections that only help for a short period of time. At this point, he is tired of having this treated and would like to get it fixed.    Past Medical/Surgical History:    Disease/Disorder Date Side Surgery Date Side Comment   Allergies, environmental      DL 07/11/2018 -dust, mold, trees & grass   Arthritis         Diabetes         Heart murmur         Hypercholesterolemia         Hypertension         Seizure disorder      DL 04/19/2021 -last seizure approx. 6-7 years ago   Tibia fracture            Arthroscopy knee 05/10/2004 left       Cardiac stent 08/2020     Aortic aneurysm   Shunt placement 1984     Stroke   Ventriculoperitoneal shunt 2009       Allergies:  No known

## 2024-10-01 ENCOUNTER — ANESTHESIA EVENT (OUTPATIENT)
Facility: HOSPITAL | Age: 74
End: 2024-10-01
Payer: MEDICARE

## 2024-10-08 ENCOUNTER — HOSPITAL ENCOUNTER (INPATIENT)
Facility: HOSPITAL | Age: 74
LOS: 3 days | Discharge: NURSING, REHAB OR LONG-TERM CARE | DRG: 448 | End: 2024-10-11
Attending: ORTHOPAEDIC SURGERY | Admitting: ORTHOPAEDIC SURGERY
Payer: MEDICARE

## 2024-10-08 ENCOUNTER — ANESTHESIA (OUTPATIENT)
Facility: HOSPITAL | Age: 74
End: 2024-10-08
Payer: MEDICARE

## 2024-10-08 ENCOUNTER — APPOINTMENT (OUTPATIENT)
Facility: HOSPITAL | Age: 74
DRG: 448 | End: 2024-10-08
Attending: ORTHOPAEDIC SURGERY
Payer: MEDICARE

## 2024-10-08 DIAGNOSIS — M48.062 SPINAL STENOSIS, LUMBAR REGION WITH NEUROGENIC CLAUDICATION: Primary | ICD-10-CM

## 2024-10-08 LAB
ABO + RH BLD: NORMAL
BLOOD GROUP ANTIBODIES SERPL: NORMAL
GLUCOSE BLD STRIP.AUTO-MCNC: 108 MG/DL (ref 70–110)
GLUCOSE BLD STRIP.AUTO-MCNC: 163 MG/DL (ref 70–110)
GLUCOSE BLD STRIP.AUTO-MCNC: 183 MG/DL (ref 70–110)
SPECIMEN EXP DATE BLD: NORMAL

## 2024-10-08 PROCEDURE — 2580000003 HC RX 258: Performed by: ORTHOPAEDIC SURGERY

## 2024-10-08 PROCEDURE — 6360000002 HC RX W HCPCS: Performed by: PHYSICIAN ASSISTANT

## 2024-10-08 PROCEDURE — 2720000010 HC SURG SUPPLY STERILE: Performed by: ORTHOPAEDIC SURGERY

## 2024-10-08 PROCEDURE — 2500000003 HC RX 250 WO HCPCS: Performed by: PHYSICIAN ASSISTANT

## 2024-10-08 PROCEDURE — 7100000001 HC PACU RECOVERY - ADDTL 15 MIN: Performed by: ORTHOPAEDIC SURGERY

## 2024-10-08 PROCEDURE — 7100000000 HC PACU RECOVERY - FIRST 15 MIN: Performed by: ORTHOPAEDIC SURGERY

## 2024-10-08 PROCEDURE — 01NB0ZZ RELEASE LUMBAR NERVE, OPEN APPROACH: ICD-10-PCS | Performed by: ORTHOPAEDIC SURGERY

## 2024-10-08 PROCEDURE — 3700000000 HC ANESTHESIA ATTENDED CARE: Performed by: ORTHOPAEDIC SURGERY

## 2024-10-08 PROCEDURE — C1713 ANCHOR/SCREW BN/BN,TIS/BN: HCPCS | Performed by: ORTHOPAEDIC SURGERY

## 2024-10-08 PROCEDURE — 1100000000 HC RM PRIVATE

## 2024-10-08 PROCEDURE — 6370000000 HC RX 637 (ALT 250 FOR IP): Performed by: PHYSICIAN ASSISTANT

## 2024-10-08 PROCEDURE — A4217 STERILE WATER/SALINE, 500 ML: HCPCS | Performed by: ORTHOPAEDIC SURGERY

## 2024-10-08 PROCEDURE — 6370000000 HC RX 637 (ALT 250 FOR IP): Performed by: ORTHOPAEDIC SURGERY

## 2024-10-08 PROCEDURE — 0SG3071 FUSION OF LUMBOSACRAL JOINT WITH AUTOLOGOUS TISSUE SUBSTITUTE, POSTERIOR APPROACH, POSTERIOR COLUMN, OPEN APPROACH: ICD-10-PCS | Performed by: ORTHOPAEDIC SURGERY

## 2024-10-08 PROCEDURE — 30233N0 TRANSFUSION OF AUTOLOGOUS RED BLOOD CELLS INTO PERIPHERAL VEIN, PERCUTANEOUS APPROACH: ICD-10-PCS | Performed by: ORTHOPAEDIC SURGERY

## 2024-10-08 PROCEDURE — 6360000002 HC RX W HCPCS: Performed by: ORTHOPAEDIC SURGERY

## 2024-10-08 PROCEDURE — 3700000001 HC ADD 15 MINUTES (ANESTHESIA): Performed by: ORTHOPAEDIC SURGERY

## 2024-10-08 PROCEDURE — 3600000012 HC SURGERY LEVEL 2 ADDTL 15MIN: Performed by: ORTHOPAEDIC SURGERY

## 2024-10-08 PROCEDURE — 3600000002 HC SURGERY LEVEL 2 BASE: Performed by: ORTHOPAEDIC SURGERY

## 2024-10-08 PROCEDURE — 36415 COLL VENOUS BLD VENIPUNCTURE: CPT

## 2024-10-08 PROCEDURE — 86900 BLOOD TYPING SEROLOGIC ABO: CPT

## 2024-10-08 PROCEDURE — 6370000000 HC RX 637 (ALT 250 FOR IP): Performed by: SPECIALIST

## 2024-10-08 PROCEDURE — 0SG1071 FUSION OF 2 OR MORE LUMBAR VERTEBRAL JOINTS WITH AUTOLOGOUS TISSUE SUBSTITUTE, POSTERIOR APPROACH, POSTERIOR COLUMN, OPEN APPROACH: ICD-10-PCS | Performed by: ORTHOPAEDIC SURGERY

## 2024-10-08 PROCEDURE — 6360000002 HC RX W HCPCS: Performed by: SPECIALIST

## 2024-10-08 PROCEDURE — 2500000003 HC RX 250 WO HCPCS

## 2024-10-08 PROCEDURE — 2580000003 HC RX 258: Performed by: PHYSICIAN ASSISTANT

## 2024-10-08 PROCEDURE — 82962 GLUCOSE BLOOD TEST: CPT

## 2024-10-08 PROCEDURE — 01NR0ZZ RELEASE SACRAL NERVE, OPEN APPROACH: ICD-10-PCS | Performed by: ORTHOPAEDIC SURGERY

## 2024-10-08 PROCEDURE — 6360000002 HC RX W HCPCS

## 2024-10-08 PROCEDURE — 77002 NEEDLE LOCALIZATION BY XRAY: CPT

## 2024-10-08 PROCEDURE — 86850 RBC ANTIBODY SCREEN: CPT

## 2024-10-08 PROCEDURE — 86901 BLOOD TYPING SEROLOGIC RH(D): CPT

## 2024-10-08 PROCEDURE — 2709999900 HC NON-CHARGEABLE SUPPLY: Performed by: ORTHOPAEDIC SURGERY

## 2024-10-08 DEVICE — GRAFT BNE MED: Type: IMPLANTABLE DEVICE | Site: SPINE LUMBAR | Status: FUNCTIONAL

## 2024-10-08 DEVICE — GRAFT BNE XL: Type: IMPLANTABLE DEVICE | Site: SPINE LUMBAR | Status: FUNCTIONAL

## 2024-10-08 DEVICE — 2CC SYRINGE OF BG PUTTY BIOACTIVE BONE GRAFT SUBSTITUTE
Type: IMPLANTABLE DEVICE | Site: SPINE LUMBAR | Status: FUNCTIONAL
Brand: FIBERGRAFT BG PUTTY

## 2024-10-08 DEVICE — SCREW SPNL L45MM DIA7MM TI SGL INNR POLYAX FOR 5.5MM ROD: Type: IMPLANTABLE DEVICE | Site: SPINE LUMBAR | Status: FUNCTIONAL

## 2024-10-08 DEVICE — IMPLANTABLE DEVICE: Type: IMPLANTABLE DEVICE | Site: SPINE LUMBAR | Status: FUNCTIONAL

## 2024-10-08 DEVICE — CONNECTOR SPNL TRNSVRS 47-62 MM ADJ FOR 55/6MM ROD TI MTRX: Type: IMPLANTABLE DEVICE | Site: SPINE LUMBAR | Status: FUNCTIONAL

## 2024-10-08 DEVICE — 11 CC SPINDLE DRIVE SYRINGE OF BG PUTTY BIOACTIVE BONE GRAFT SUBSTITUTE.
Type: IMPLANTABLE DEVICE | Site: SPINE LUMBAR | Status: FUNCTIONAL
Brand: FIBERGRAFT BG PUTTY-GPS

## 2024-10-08 DEVICE — SET SCR SPNL L25MM DIA5.5MM TI FOR 5.5MM ROD EXPEDIUM: Type: IMPLANTABLE DEVICE | Site: SPINE LUMBAR | Status: FUNCTIONAL

## 2024-10-08 DEVICE — SCREW SPNL L40MM DIA7MM TI SGL INNR POLYAX FOR 5.5MM ROD: Type: IMPLANTABLE DEVICE | Site: SPINE LUMBAR | Status: FUNCTIONAL

## 2024-10-08 RX ORDER — DIPHENHYDRAMINE HYDROCHLORIDE 50 MG/ML
12.5 INJECTION INTRAMUSCULAR; INTRAVENOUS EVERY 6 HOURS PRN
Status: DISCONTINUED | OUTPATIENT
Start: 2024-10-08 | End: 2024-10-11 | Stop reason: HOSPADM

## 2024-10-08 RX ORDER — CYCLOBENZAPRINE HCL 10 MG
5 TABLET ORAL 3 TIMES DAILY PRN
Status: DISCONTINUED | OUTPATIENT
Start: 2024-10-08 | End: 2024-10-10

## 2024-10-08 RX ORDER — TOPIRAMATE 25 MG/1
50 TABLET, FILM COATED ORAL 2 TIMES DAILY
Status: DISCONTINUED | OUTPATIENT
Start: 2024-10-08 | End: 2024-10-11 | Stop reason: HOSPADM

## 2024-10-08 RX ORDER — SODIUM CHLORIDE 0.9 % (FLUSH) 0.9 %
5-40 SYRINGE (ML) INJECTION EVERY 12 HOURS SCHEDULED
Status: DISCONTINUED | OUTPATIENT
Start: 2024-10-08 | End: 2024-10-08 | Stop reason: HOSPADM

## 2024-10-08 RX ORDER — SODIUM CHLORIDE 0.9 % (FLUSH) 0.9 %
5-40 SYRINGE (ML) INJECTION PRN
Status: DISCONTINUED | OUTPATIENT
Start: 2024-10-08 | End: 2024-10-08 | Stop reason: HOSPADM

## 2024-10-08 RX ORDER — BISACODYL 5 MG/1
5 TABLET, DELAYED RELEASE ORAL DAILY
Status: DISCONTINUED | OUTPATIENT
Start: 2024-10-08 | End: 2024-10-11 | Stop reason: HOSPADM

## 2024-10-08 RX ORDER — SODIUM CHLORIDE 9 MG/ML
INJECTION, SOLUTION INTRAVENOUS PRN
Status: DISCONTINUED | OUTPATIENT
Start: 2024-10-08 | End: 2024-10-08 | Stop reason: HOSPADM

## 2024-10-08 RX ORDER — FENTANYL CITRATE 50 UG/ML
25 INJECTION, SOLUTION INTRAMUSCULAR; INTRAVENOUS EVERY 5 MIN PRN
Status: DISCONTINUED | OUTPATIENT
Start: 2024-10-08 | End: 2024-10-08 | Stop reason: HOSPADM

## 2024-10-08 RX ORDER — OXYCODONE HYDROCHLORIDE 5 MG/1
5 TABLET ORAL
Status: DISCONTINUED | OUTPATIENT
Start: 2024-10-08 | End: 2024-10-08 | Stop reason: HOSPADM

## 2024-10-08 RX ORDER — ACETAMINOPHEN 325 MG/1
650 TABLET ORAL EVERY 4 HOURS PRN
Status: DISCONTINUED | OUTPATIENT
Start: 2024-10-08 | End: 2024-10-11 | Stop reason: HOSPADM

## 2024-10-08 RX ORDER — ONDANSETRON 2 MG/ML
4 INJECTION INTRAMUSCULAR; INTRAVENOUS EVERY 6 HOURS PRN
Status: DISCONTINUED | OUTPATIENT
Start: 2024-10-08 | End: 2024-10-11 | Stop reason: HOSPADM

## 2024-10-08 RX ORDER — POVIDONE-IODINE 5 %
SOLUTION, NON-ORAL OPHTHALMIC (EYE) PRN
Status: DISCONTINUED | OUTPATIENT
Start: 2024-10-08 | End: 2024-10-08 | Stop reason: ALTCHOICE

## 2024-10-08 RX ORDER — ONDANSETRON 2 MG/ML
4 INJECTION INTRAMUSCULAR; INTRAVENOUS
Status: DISCONTINUED | OUTPATIENT
Start: 2024-10-08 | End: 2024-10-08 | Stop reason: HOSPADM

## 2024-10-08 RX ORDER — ROCURONIUM BROMIDE 10 MG/ML
INJECTION, SOLUTION INTRAVENOUS
Status: DISCONTINUED | OUTPATIENT
Start: 2024-10-08 | End: 2024-10-08 | Stop reason: SDUPTHER

## 2024-10-08 RX ORDER — DEXTROSE MONOHYDRATE 100 MG/ML
INJECTION, SOLUTION INTRAVENOUS CONTINUOUS PRN
Status: DISCONTINUED | OUTPATIENT
Start: 2024-10-08 | End: 2024-10-11 | Stop reason: HOSPADM

## 2024-10-08 RX ORDER — EPHEDRINE SULFATE 5 MG/ML
INJECTION INTRAVENOUS
Status: DISCONTINUED | OUTPATIENT
Start: 2024-10-08 | End: 2024-10-08 | Stop reason: SDUPTHER

## 2024-10-08 RX ORDER — DROPERIDOL 2.5 MG/ML
0.62 INJECTION, SOLUTION INTRAMUSCULAR; INTRAVENOUS
Status: DISCONTINUED | OUTPATIENT
Start: 2024-10-08 | End: 2024-10-08 | Stop reason: HOSPADM

## 2024-10-08 RX ORDER — GLUCAGON 1 MG/ML
1 KIT INJECTION PRN
Status: DISCONTINUED | OUTPATIENT
Start: 2024-10-08 | End: 2024-10-11 | Stop reason: HOSPADM

## 2024-10-08 RX ORDER — POLYETHYLENE GLYCOL 3350 17 G/17G
17 POWDER, FOR SOLUTION ORAL DAILY
Status: DISCONTINUED | OUTPATIENT
Start: 2024-10-08 | End: 2024-10-11 | Stop reason: HOSPADM

## 2024-10-08 RX ORDER — INSULIN LISPRO 100 [IU]/ML
0-4 INJECTION, SOLUTION INTRAVENOUS; SUBCUTANEOUS NIGHTLY
Status: DISCONTINUED | OUTPATIENT
Start: 2024-10-08 | End: 2024-10-11 | Stop reason: HOSPADM

## 2024-10-08 RX ORDER — TRANEXAMIC ACID 10 MG/ML
1000 INJECTION, SOLUTION INTRAVENOUS ONCE
Status: COMPLETED | OUTPATIENT
Start: 2024-10-08 | End: 2024-10-08

## 2024-10-08 RX ORDER — DEXMEDETOMIDINE HYDROCHLORIDE 100 UG/ML
INJECTION, SOLUTION INTRAVENOUS
Status: DISCONTINUED | OUTPATIENT
Start: 2024-10-08 | End: 2024-10-08

## 2024-10-08 RX ORDER — ONDANSETRON 4 MG/1
4 TABLET, ORALLY DISINTEGRATING ORAL EVERY 8 HOURS PRN
Status: DISCONTINUED | OUTPATIENT
Start: 2024-10-08 | End: 2024-10-11 | Stop reason: HOSPADM

## 2024-10-08 RX ORDER — FENTANYL CITRATE 50 UG/ML
INJECTION, SOLUTION INTRAMUSCULAR; INTRAVENOUS
Status: DISCONTINUED | OUTPATIENT
Start: 2024-10-08 | End: 2024-10-08 | Stop reason: SDUPTHER

## 2024-10-08 RX ORDER — OXYCODONE HYDROCHLORIDE 5 MG/1
5 TABLET ORAL EVERY 4 HOURS PRN
Status: DISCONTINUED | OUTPATIENT
Start: 2024-10-08 | End: 2024-10-11 | Stop reason: HOSPADM

## 2024-10-08 RX ORDER — SODIUM CHLORIDE 0.9 % (FLUSH) 0.9 %
5-40 SYRINGE (ML) INJECTION PRN
Status: DISCONTINUED | OUTPATIENT
Start: 2024-10-08 | End: 2024-10-11 | Stop reason: HOSPADM

## 2024-10-08 RX ORDER — MAGNESIUM HYDROXIDE/ALUMINUM HYDROXICE/SIMETHICONE 120; 1200; 1200 MG/30ML; MG/30ML; MG/30ML
15 SUSPENSION ORAL EVERY 6 HOURS PRN
Status: DISCONTINUED | OUTPATIENT
Start: 2024-10-08 | End: 2024-10-11 | Stop reason: HOSPADM

## 2024-10-08 RX ORDER — SODIUM CHLORIDE 0.9 % (FLUSH) 0.9 %
5-40 SYRINGE (ML) INJECTION EVERY 12 HOURS SCHEDULED
Status: DISCONTINUED | OUTPATIENT
Start: 2024-10-08 | End: 2024-10-11 | Stop reason: HOSPADM

## 2024-10-08 RX ORDER — METOPROLOL SUCCINATE 25 MG/1
25 TABLET, EXTENDED RELEASE ORAL
Status: COMPLETED | OUTPATIENT
Start: 2024-10-08 | End: 2024-10-08

## 2024-10-08 RX ORDER — PROPOFOL 10 MG/ML
INJECTION, EMULSION INTRAVENOUS
Status: DISCONTINUED | OUTPATIENT
Start: 2024-10-08 | End: 2024-10-08 | Stop reason: SDUPTHER

## 2024-10-08 RX ORDER — SODIUM CHLORIDE 9 MG/ML
INJECTION, SOLUTION INTRAVENOUS PRN
Status: DISCONTINUED | OUTPATIENT
Start: 2024-10-08 | End: 2024-10-11 | Stop reason: HOSPADM

## 2024-10-08 RX ORDER — NALOXONE HYDROCHLORIDE 0.4 MG/ML
INJECTION, SOLUTION INTRAMUSCULAR; INTRAVENOUS; SUBCUTANEOUS PRN
Status: DISCONTINUED | OUTPATIENT
Start: 2024-10-08 | End: 2024-10-09

## 2024-10-08 RX ORDER — LIDOCAINE HYDROCHLORIDE 20 MG/ML
INJECTION, SOLUTION INTRAVENOUS
Status: DISCONTINUED | OUTPATIENT
Start: 2024-10-08 | End: 2024-10-08 | Stop reason: SDUPTHER

## 2024-10-08 RX ORDER — GABAPENTIN 300 MG/1
300 CAPSULE ORAL 2 TIMES DAILY
Status: DISCONTINUED | OUTPATIENT
Start: 2024-10-08 | End: 2024-10-11 | Stop reason: HOSPADM

## 2024-10-08 RX ORDER — NALOXONE HYDROCHLORIDE 0.4 MG/ML
INJECTION, SOLUTION INTRAMUSCULAR; INTRAVENOUS; SUBCUTANEOUS PRN
Status: DISCONTINUED | OUTPATIENT
Start: 2024-10-08 | End: 2024-10-08 | Stop reason: HOSPADM

## 2024-10-08 RX ORDER — HYDROMORPHONE HYDROCHLORIDE 1 MG/ML
0.25 INJECTION, SOLUTION INTRAMUSCULAR; INTRAVENOUS; SUBCUTANEOUS EVERY 5 MIN PRN
Status: COMPLETED | OUTPATIENT
Start: 2024-10-08 | End: 2024-10-08

## 2024-10-08 RX ORDER — OXYCODONE HYDROCHLORIDE 5 MG/1
10 TABLET ORAL EVERY 4 HOURS PRN
Status: DISCONTINUED | OUTPATIENT
Start: 2024-10-08 | End: 2024-10-11 | Stop reason: HOSPADM

## 2024-10-08 RX ORDER — DEXMEDETOMIDINE HYDROCHLORIDE 100 UG/ML
INJECTION, SOLUTION INTRAVENOUS
Status: DISCONTINUED | OUTPATIENT
Start: 2024-10-08 | End: 2024-10-08 | Stop reason: SDUPTHER

## 2024-10-08 RX ORDER — DEXAMETHASONE SODIUM PHOSPHATE 4 MG/ML
INJECTION, SOLUTION INTRA-ARTICULAR; INTRALESIONAL; INTRAMUSCULAR; INTRAVENOUS; SOFT TISSUE
Status: DISCONTINUED | OUTPATIENT
Start: 2024-10-08 | End: 2024-10-08 | Stop reason: SDUPTHER

## 2024-10-08 RX ORDER — SODIUM CHLORIDE, SODIUM LACTATE, POTASSIUM CHLORIDE, CALCIUM CHLORIDE 600; 310; 30; 20 MG/100ML; MG/100ML; MG/100ML; MG/100ML
INJECTION, SOLUTION INTRAVENOUS CONTINUOUS
Status: DISCONTINUED | OUTPATIENT
Start: 2024-10-08 | End: 2024-10-10

## 2024-10-08 RX ORDER — MIDAZOLAM HYDROCHLORIDE 1 MG/ML
INJECTION INTRAMUSCULAR; INTRAVENOUS
Status: DISCONTINUED | OUTPATIENT
Start: 2024-10-08 | End: 2024-10-08 | Stop reason: SDUPTHER

## 2024-10-08 RX ORDER — INSULIN LISPRO 100 [IU]/ML
0-16 INJECTION, SOLUTION INTRAVENOUS; SUBCUTANEOUS
Status: DISCONTINUED | OUTPATIENT
Start: 2024-10-09 | End: 2024-10-11 | Stop reason: HOSPADM

## 2024-10-08 RX ORDER — DIPHENHYDRAMINE HYDROCHLORIDE 50 MG/ML
12.5 INJECTION INTRAMUSCULAR; INTRAVENOUS
Status: DISCONTINUED | OUTPATIENT
Start: 2024-10-08 | End: 2024-10-08 | Stop reason: HOSPADM

## 2024-10-08 RX ORDER — SODIUM CHLORIDE, SODIUM LACTATE, POTASSIUM CHLORIDE, CALCIUM CHLORIDE 600; 310; 30; 20 MG/100ML; MG/100ML; MG/100ML; MG/100ML
INJECTION, SOLUTION INTRAVENOUS CONTINUOUS
Status: DISCONTINUED | OUTPATIENT
Start: 2024-10-08 | End: 2024-10-08 | Stop reason: HOSPADM

## 2024-10-08 RX ORDER — LABETALOL HYDROCHLORIDE 5 MG/ML
10 INJECTION, SOLUTION INTRAVENOUS
Status: DISCONTINUED | OUTPATIENT
Start: 2024-10-08 | End: 2024-10-08 | Stop reason: HOSPADM

## 2024-10-08 RX ORDER — ONDANSETRON 2 MG/ML
INJECTION INTRAMUSCULAR; INTRAVENOUS
Status: DISCONTINUED | OUTPATIENT
Start: 2024-10-08 | End: 2024-10-08 | Stop reason: SDUPTHER

## 2024-10-08 RX ADMIN — HYDROMORPHONE HYDROCHLORIDE 0.25 MG: 1 INJECTION, SOLUTION INTRAMUSCULAR; INTRAVENOUS; SUBCUTANEOUS at 19:15

## 2024-10-08 RX ADMIN — EPHEDRINE SULFATE 5 MG: 5 INJECTION INTRAVENOUS at 16:17

## 2024-10-08 RX ADMIN — EPHEDRINE SULFATE 5 MG: 5 INJECTION INTRAVENOUS at 14:49

## 2024-10-08 RX ADMIN — EPHEDRINE SULFATE 5 MG: 5 INJECTION INTRAVENOUS at 15:39

## 2024-10-08 RX ADMIN — METOPROLOL SUCCINATE 25 MG: 25 TABLET, EXTENDED RELEASE ORAL at 12:02

## 2024-10-08 RX ADMIN — DEXAMETHASONE SODIUM PHOSPHATE 8 MG: 4 INJECTION, SOLUTION INTRAMUSCULAR; INTRAVENOUS at 14:30

## 2024-10-08 RX ADMIN — FENTANYL CITRATE 25 MCG: 50 INJECTION INTRAMUSCULAR; INTRAVENOUS at 19:23

## 2024-10-08 RX ADMIN — DEXMEDETOMIDINE HYDROCHLORIDE 6 MCG: 100 INJECTION, SOLUTION INTRAVENOUS at 18:24

## 2024-10-08 RX ADMIN — SODIUM CHLORIDE, SODIUM LACTATE, POTASSIUM CHLORIDE, AND CALCIUM CHLORIDE: 600; 310; 30; 20 INJECTION, SOLUTION INTRAVENOUS at 20:25

## 2024-10-08 RX ADMIN — ROCURONIUM BROMIDE 50 MG: 10 INJECTION, SOLUTION INTRAVENOUS at 14:16

## 2024-10-08 RX ADMIN — ROCURONIUM BROMIDE 10 MG: 10 INJECTION, SOLUTION INTRAVENOUS at 17:00

## 2024-10-08 RX ADMIN — Medication 20 MG: at 15:24

## 2024-10-08 RX ADMIN — SUGAMMADEX 200 MG: 100 INJECTION, SOLUTION INTRAVENOUS at 18:19

## 2024-10-08 RX ADMIN — GABAPENTIN 300 MG: 300 CAPSULE ORAL at 21:27

## 2024-10-08 RX ADMIN — Medication 10 MG: at 17:25

## 2024-10-08 RX ADMIN — HYDROMORPHONE HYDROCHLORIDE 0.5 MG: 1 INJECTION, SOLUTION INTRAMUSCULAR; INTRAVENOUS; SUBCUTANEOUS at 18:04

## 2024-10-08 RX ADMIN — TRANEXAMIC ACID 1000 MG: 10 INJECTION, SOLUTION INTRAVENOUS at 14:30

## 2024-10-08 RX ADMIN — LIDOCAINE HYDROCHLORIDE 100 MG: 20 INJECTION, SOLUTION INTRAVENOUS at 14:15

## 2024-10-08 RX ADMIN — HYDROMORPHONE HYDROCHLORIDE 0.5 MG: 1 INJECTION, SOLUTION INTRAMUSCULAR; INTRAVENOUS; SUBCUTANEOUS at 18:09

## 2024-10-08 RX ADMIN — ROCURONIUM BROMIDE 10 MG: 10 INJECTION, SOLUTION INTRAVENOUS at 17:16

## 2024-10-08 RX ADMIN — TOPIRAMATE 50 MG: 25 TABLET, FILM COATED ORAL at 21:27

## 2024-10-08 RX ADMIN — SODIUM CHLORIDE, SODIUM LACTATE, POTASSIUM CHLORIDE, AND CALCIUM CHLORIDE: 600; 310; 30; 20 INJECTION, SOLUTION INTRAVENOUS at 16:18

## 2024-10-08 RX ADMIN — ONDANSETRON HYDROCHLORIDE 4 MG: 2 INJECTION INTRAMUSCULAR; INTRAVENOUS at 18:09

## 2024-10-08 RX ADMIN — WATER 2000 MG: 1 INJECTION INTRAMUSCULAR; INTRAVENOUS; SUBCUTANEOUS at 15:17

## 2024-10-08 RX ADMIN — HYDROMORPHONE HYDROCHLORIDE 0.25 MG: 1 INJECTION, SOLUTION INTRAMUSCULAR; INTRAVENOUS; SUBCUTANEOUS at 18:56

## 2024-10-08 RX ADMIN — ROCURONIUM BROMIDE 20 MG: 10 INJECTION, SOLUTION INTRAVENOUS at 16:15

## 2024-10-08 RX ADMIN — Medication 20 MG: at 17:05

## 2024-10-08 RX ADMIN — EPHEDRINE SULFATE 5 MG: 5 INJECTION INTRAVENOUS at 15:34

## 2024-10-08 RX ADMIN — HYDROMORPHONE HYDROCHLORIDE 0.25 MG: 1 INJECTION, SOLUTION INTRAMUSCULAR; INTRAVENOUS; SUBCUTANEOUS at 19:09

## 2024-10-08 RX ADMIN — FENTANYL CITRATE 50 MCG: 50 INJECTION, SOLUTION INTRAMUSCULAR; INTRAVENOUS at 14:13

## 2024-10-08 RX ADMIN — PROPOFOL 150 MG: 10 INJECTION, EMULSION INTRAVENOUS at 14:15

## 2024-10-08 RX ADMIN — HYDROMORPHONE HYDROCHLORIDE 0.25 MG: 1 INJECTION, SOLUTION INTRAMUSCULAR; INTRAVENOUS; SUBCUTANEOUS at 19:04

## 2024-10-08 RX ADMIN — OXYCODONE 10 MG: 5 TABLET ORAL at 22:07

## 2024-10-08 RX ADMIN — EPHEDRINE SULFATE 5 MG: 5 INJECTION INTRAVENOUS at 14:34

## 2024-10-08 RX ADMIN — WATER 2000 MG: 1 INJECTION INTRAMUSCULAR; INTRAVENOUS; SUBCUTANEOUS at 21:25

## 2024-10-08 RX ADMIN — ROCURONIUM BROMIDE 20 MG: 10 INJECTION, SOLUTION INTRAVENOUS at 15:15

## 2024-10-08 RX ADMIN — SODIUM CHLORIDE, SODIUM LACTATE, POTASSIUM CHLORIDE, AND CALCIUM CHLORIDE: 600; 310; 30; 20 INJECTION, SOLUTION INTRAVENOUS at 11:41

## 2024-10-08 RX ADMIN — FENTANYL CITRATE 50 MCG: 50 INJECTION, SOLUTION INTRAMUSCULAR; INTRAVENOUS at 14:10

## 2024-10-08 RX ADMIN — Medication: at 19:31

## 2024-10-08 RX ADMIN — MIDAZOLAM 2 MG: 1 INJECTION INTRAMUSCULAR; INTRAVENOUS at 14:10

## 2024-10-08 ASSESSMENT — PAIN DESCRIPTION - ONSET
ONSET: AWAKENED FROM SLEEP
ONSET: ON-GOING

## 2024-10-08 ASSESSMENT — PAIN DESCRIPTION - FREQUENCY
FREQUENCY: CONTINUOUS

## 2024-10-08 ASSESSMENT — PAIN DESCRIPTION - DESCRIPTORS
DESCRIPTORS: DISCOMFORT
DESCRIPTORS: ACHING
DESCRIPTORS: ACHING
DESCRIPTORS: DISCOMFORT
DESCRIPTORS: STABBING;SORE

## 2024-10-08 ASSESSMENT — PAIN DESCRIPTION - ORIENTATION
ORIENTATION: LOWER
ORIENTATION: MID;LOWER
ORIENTATION: LOWER

## 2024-10-08 ASSESSMENT — PAIN DESCRIPTION - PAIN TYPE
TYPE: SURGICAL PAIN

## 2024-10-08 ASSESSMENT — PAIN SCALES - GENERAL
PAINLEVEL_OUTOF10: 10
PAINLEVEL_OUTOF10: 10
PAINLEVEL_OUTOF10: 8
PAINLEVEL_OUTOF10: 10
PAINLEVEL_OUTOF10: 10
PAINLEVEL_OUTOF10: 6
PAINLEVEL_OUTOF10: 8

## 2024-10-08 ASSESSMENT — PAIN DESCRIPTION - LOCATION
LOCATION: BACK

## 2024-10-08 ASSESSMENT — PAIN - FUNCTIONAL ASSESSMENT
PAIN_FUNCTIONAL_ASSESSMENT: ACTIVITIES ARE NOT PREVENTED
PAIN_FUNCTIONAL_ASSESSMENT: PREVENTS OR INTERFERES SOME ACTIVE ACTIVITIES AND ADLS
PAIN_FUNCTIONAL_ASSESSMENT: ACTIVITIES ARE NOT PREVENTED
PAIN_FUNCTIONAL_ASSESSMENT: PREVENTS OR INTERFERES SOME ACTIVE ACTIVITIES AND ADLS
PAIN_FUNCTIONAL_ASSESSMENT: ACTIVITIES ARE NOT PREVENTED
PAIN_FUNCTIONAL_ASSESSMENT: 0-10
PAIN_FUNCTIONAL_ASSESSMENT: ACTIVITIES ARE NOT PREVENTED

## 2024-10-08 ASSESSMENT — PAIN DESCRIPTION - DIRECTION: RADIATING_TOWARDS: R THIGH

## 2024-10-08 NOTE — INTERVAL H&P NOTE
Update History & Physical    The patient's History and Physical was reviewed with the patient and I examined the patient. There was no change. The surgical site was confirmed by the patient and me.     Plan: The risks, benefits, expected outcome, and alternative to the recommended procedure have been discussed with the patient. Patient understands and wants to proceed with the procedure.     Electronically signed by ZINA VALLEJO MD on 10/8/2024 at 12:14 PM

## 2024-10-08 NOTE — OP NOTE
Patient: Jj Landon MRN: [id number]  SSN: [id number]    YOB: 1950  Age: 73 y.o.  Sex: male        Date of Procedure: [unfilled]   Preoperative Diagnosis: Spondylolisthesis of lumbar region [M43.16]  Postoperative Diagnosis: * No post-op diagnosis entered *    Procedure: Procedure(s):  LUMBAR 2- SACRAL 1 DECOMPRESSION FUSION WITH C-ARM OP 23 \"SPEC POP\"  Surgeons and Role:     * Hans Goldman MD - Primary  Assistant: Carolee Miner PA-C  OR Assitance: Surgical Assistant: Kajal Alexander  Scrub Person First: Acosta Babin  Physician Assistant: Carolee Miner PA  Anesthesia: General   Estimated Blood Loss: 250cc  Fluids: 1000cc 125cc Cell Saver  Specimens: * No specimens in log *   Findings: same  Complications: none  Implants:   Implant Name Type Inv. Item Serial No.  Lot No. LRB No. Used Action   GRAFT BONE SUBSTITUTEXSM 2CC BIOACTIVE GLS PUTTY FIBERGRFT - [id number]  GRAFT BONE SUBSTITUTEXSM 2CC BIOACTIVE GLS PUTTY FIBERGRFT  PROSIDYAN-WD [id number] N/A 1 Implanted   GRAFT BNE PTTY LG 11 CC BIOACTIVE FIBERGRAFT BG GPS - [id number]  GRAFT BNE PTTY LG 11 CC BIOACTIVE FIBERGRAFT BG GPS  PROSIDYAN-WD [id number] N/A 1 Implanted   GRAFT BNE XL - [id number]  GRAFT BNE XL [id number] BIOLOGICA  N/A 1 Implanted   GRAFT BNE MED - [id number]  GRAFT BNE MED [id number] BIOLOGICA  N/A 1 Implanted   SET SCR SPNL L25MM DIA5.5MM TI FOR 5.5MM GENOVEVA EXPEDIUM - [id number]  SET SCR SPNL L25MM DIA5.5MM TI FOR 5.5MM GENOVEVA EXPEDIUM  [employer] SPINE-WD [id number] N/A 10 Implanted   GENOVEVA SPNL L125MM [id number] TI PRELORDOSED EXPEDIUM - [id number]  GENOVEVA SPNL L125MM [id number] TI PRELORDOSED EXPEDIUM  [id number] [employer] SYNTHES SPINE-WD [id number] N/A 2 Implanted   SCREW SPNL L45MM DIA7MM TI SGL INNR POLYAX FOR 5.5MM GENOVEVA - [id number]  SCREW SPNL L45MM DIA7MM TI SGL INNR POLYAX FOR 5.5MM GENOVEVA  [employer] SPINE-WD [id number] N/A 8 Implanted   SCREW SPNL L40MM DIA7MM TI SGL INNR POLYAX FOR 5.5MM GENOVEVA - [id number]   improvement in overall alingment.  Three column osteotomy was then performed at L3-4 with contouring the posterior portion of the vertebral bodies at L3 and L4 which allowed for correction into lordosis.  The wound   was then irrigated. Bur was then used to open the posterior aspect of the   pedicles bilaterally at L2-S1. A gearshift probe was then placed through   each of these posterior bur holes, through the pedicle, into vertebral body   under intraoperative fluoroscopy. Ball-tipped probe was then placed through   each gearshift tracts, ensuring the gearshift had only gone through the bone.   Pedicle screws were then placed bilaterally at L2-S1.   Rods were then fixed to the pedicle screws using the locking caps. Each of these caps were then torqued into position. Intraoperative x-ray revealed excellent alignment of the  hardware and anatomy. Wound was then irrigated with 1000cc of pulse lavage irrigation. Bur was then used to decorticate the transverse processes and the residual pars bilaterally as well as removing of the cartilage surface of the facet joints and decorticating the articular processes of the facet joints bilaterally.  Bone graft that was taken from the laminectomy site was mixed with DBM and then placed in the   posterolateral gutters as well as in facet joints. Drain was then placed.   Fascia was then closed using #1 Vicryl suture. Subcutaneous tissue   approximated with 2-0 Vicryl suture. Skin approximated with staples. Sterile dressing was applied.   The patient tolerated the procedure well and taken to Recovery room in good   condition.     Assistant surgeon was needed throughout the procedure for managing bleeding, improving visualization and closure of the surgical wounds.

## 2024-10-08 NOTE — PERIOP NOTE
Notified by PreOp AISSATOU Mahmood pt did not take his Metoprolol this am. MD Awad notified in person of pt. Vitals and given OK to order metoprolol. Ela REYEZ made aware.

## 2024-10-08 NOTE — PERIOP NOTE
TRANSFER - IN REPORT:    Verbal report received from ORN & CRNA on Jj Landon  being received from OR for routine progression of patient care      Report consisted of patient's Situation, Background, Assessment and   Recommendations(SBAR).     Information from the following report(s) Adult Overview, Surgery Report, Intake/Output, and MAR was reviewed with the receiving nurse.    Opportunity for questions and clarification was provided.      Assessment completed upon patient's arrival to unit and care assumed.

## 2024-10-08 NOTE — ANESTHESIA PRE PROCEDURE
Cardiovascular:  Exercise tolerance: good (>4 METS)  (+) hypertension: no interval change, valvular problems/murmurs (mild): AS and no interval change, CAD: no interval change, CABG/stent: no interval change, hyperlipidemia            Echocardiogram reviewed    Cleared by cardiology              Neuro/Psych:   (+) TIA             ROS comment:  shunt for many years \"cysts on brain\" GI/Hepatic/Renal:   (+) renal disease: CRI          Endo/Other:    (+) DiabetesType II DM, no interval change, using insulin.                 Abdominal:             Vascular:          Other Findings:             Anesthesia Plan      general     ASA 3       Induction: intravenous.    MIPS: Postoperative opioids intended.  Anesthetic plan and risks discussed with patient.      Plan discussed with CRNA.    Attending anesthesiologist reviewed and agrees with Preprocedure content          O discussed with patient that his jonah-procedural risk is elevated based on his multiple comorbidities. He is aware and fully understands. He does have clearances from his cardiologist and other physicians and does appear well optimized.      Satnam Awad MD   10/8/2024

## 2024-10-08 NOTE — PERIOP NOTE
Reviewed PTA medication list with patient/caregiver and patient/caregiver denies any additional medications.     Patient admits to having a responsible adult care for them at home for at least 24 hours after surgery.    Patient encouraged to use gown warming system and informed that using said warming gown to regulate body temperature prior to a procedure has been shown to help reduce the risks of blood clots and infection.    Patient's pharmacy of choice verified and documented in PTA medication section.    Dual skin assessment & fall risk band verification completed with AISSATOU Peck.  Metoprolol last dose a ago. Notified holding to communicate  with anesthesiologist.

## 2024-10-08 NOTE — ANESTHESIA POSTPROCEDURE EVALUATION
Department of Anesthesiology  Postprocedure Note    Patient: Jj Landon  MRN: [id number]  YOB: 1950  Date of evaluation: 10/8/2024    Procedure Summary       Date: 10/08/24 Room / Location: [facility] MAIN 08 / [facility] MAIN OR    Anesthesia Start: 1410 Anesthesia Stop: 1841    Procedure: LUMBAR 2- SACRAL 1 DECOMPRESSION FUSION WITH C-ARM OP 23 \"SPEC POP\" (Spine Lumbar) Diagnosis:       Spondylolisthesis of lumbar region      (Spondylolisthesis of lumbar region [M43.16])    Surgeons: Hans Goldman MD Responsible Provider: Tommy Leslie DO    Anesthesia Type: General ASA Status: 3            Anesthesia Type: General    Donald Phase I: Donald Score: 9    Donald Phase II:      Anesthesia Post Evaluation    Patient location during evaluation: PACU  Patient participation: complete - patient participated  Level of consciousness: awake and alert  Pain score: 10  Airway patency: patent  Nausea & Vomiting: no nausea and no vomiting  Cardiovascular status: blood pressure returned to baseline  Respiratory status: acceptable  Hydration status: euvolemic  Multimodal analgesia pain management approach  Pain management: inadequate (will continue to work on improving his pain)        No notable events documented.

## 2024-10-09 PROBLEM — M48.062 SPINAL STENOSIS, LUMBAR REGION WITH NEUROGENIC CLAUDICATION: Status: RESOLVED | Noted: 2024-08-20 | Resolved: 2024-10-09

## 2024-10-09 LAB
GLUCOSE BLD STRIP.AUTO-MCNC: 245 MG/DL (ref 70–110)
GLUCOSE BLD STRIP.AUTO-MCNC: 276 MG/DL (ref 70–110)
GLUCOSE BLD STRIP.AUTO-MCNC: 317 MG/DL (ref 70–110)
HCT VFR BLD AUTO: 35.4 % (ref 36–48)
HGB BLD-MCNC: 11.1 G/DL (ref 13–16)

## 2024-10-09 PROCEDURE — 2580000003 HC RX 258: Performed by: PHYSICIAN ASSISTANT

## 2024-10-09 PROCEDURE — 97166 OT EVAL MOD COMPLEX 45 MIN: CPT

## 2024-10-09 PROCEDURE — 97535 SELF CARE MNGMENT TRAINING: CPT

## 2024-10-09 PROCEDURE — 85018 HEMOGLOBIN: CPT

## 2024-10-09 PROCEDURE — 6360000002 HC RX W HCPCS: Performed by: PHYSICIAN ASSISTANT

## 2024-10-09 PROCEDURE — 36415 COLL VENOUS BLD VENIPUNCTURE: CPT

## 2024-10-09 PROCEDURE — 82962 GLUCOSE BLOOD TEST: CPT

## 2024-10-09 PROCEDURE — 1100000000 HC RM PRIVATE

## 2024-10-09 PROCEDURE — 2700000000 HC OXYGEN THERAPY PER DAY

## 2024-10-09 PROCEDURE — 2580000003 HC RX 258: Performed by: ORTHOPAEDIC SURGERY

## 2024-10-09 PROCEDURE — 97530 THERAPEUTIC ACTIVITIES: CPT

## 2024-10-09 PROCEDURE — 97162 PT EVAL MOD COMPLEX 30 MIN: CPT

## 2024-10-09 PROCEDURE — 85014 HEMATOCRIT: CPT

## 2024-10-09 PROCEDURE — 6370000000 HC RX 637 (ALT 250 FOR IP): Performed by: PHYSICIAN ASSISTANT

## 2024-10-09 RX ORDER — TAMSULOSIN HYDROCHLORIDE 0.4 MG/1
0.4 CAPSULE ORAL DAILY
Status: DISCONTINUED | OUTPATIENT
Start: 2024-10-09 | End: 2024-10-11 | Stop reason: HOSPADM

## 2024-10-09 RX ORDER — SPIRONOLACTONE 25 MG/1
12.5 TABLET ORAL EVERY MORNING
Status: DISCONTINUED | OUTPATIENT
Start: 2024-10-09 | End: 2024-10-11 | Stop reason: HOSPADM

## 2024-10-09 RX ORDER — OXYCODONE AND ACETAMINOPHEN 5; 325 MG/1; MG/1
1-1.5 TABLET ORAL EVERY 6 HOURS PRN
Qty: 56 TABLET | Refills: 0 | Status: SHIPPED | OUTPATIENT
Start: 2024-10-09 | End: 2024-10-10

## 2024-10-09 RX ORDER — CLONIDINE HYDROCHLORIDE 0.1 MG/1
0.1 TABLET ORAL EVERY MORNING
Status: DISCONTINUED | OUTPATIENT
Start: 2024-10-09 | End: 2024-10-11 | Stop reason: HOSPADM

## 2024-10-09 RX ORDER — ISOSORBIDE MONONITRATE 60 MG/1
60 TABLET, EXTENDED RELEASE ORAL EVERY MORNING
Status: DISCONTINUED | OUTPATIENT
Start: 2024-10-09 | End: 2024-10-11 | Stop reason: HOSPADM

## 2024-10-09 RX ORDER — POLYETHYLENE GLYCOL 3350 17 G/17G
17 POWDER, FOR SOLUTION ORAL 2 TIMES DAILY PRN
Qty: 28 G | Refills: 1 | Status: SHIPPED | OUTPATIENT
Start: 2024-10-09 | End: 2024-10-23

## 2024-10-09 RX ORDER — CYCLOBENZAPRINE HCL 10 MG
5 TABLET ORAL 3 TIMES DAILY PRN
Qty: 30 TABLET | Refills: 0 | Status: SHIPPED | OUTPATIENT
Start: 2024-10-09 | End: 2024-10-10 | Stop reason: HOSPADM

## 2024-10-09 RX ORDER — METOPROLOL SUCCINATE 25 MG/1
25 TABLET, EXTENDED RELEASE ORAL EVERY MORNING
Status: DISCONTINUED | OUTPATIENT
Start: 2024-10-09 | End: 2024-10-11 | Stop reason: HOSPADM

## 2024-10-09 RX ORDER — LISINOPRIL 20 MG/1
20 TABLET ORAL EVERY MORNING
Status: DISCONTINUED | OUTPATIENT
Start: 2024-10-09 | End: 2024-10-11 | Stop reason: HOSPADM

## 2024-10-09 RX ORDER — CLOPIDOGREL BISULFATE 75 MG/1
75 TABLET ORAL EVERY MORNING
Status: DISCONTINUED | OUTPATIENT
Start: 2024-10-09 | End: 2024-10-11 | Stop reason: HOSPADM

## 2024-10-09 RX ADMIN — WATER 2000 MG: 1 INJECTION INTRAMUSCULAR; INTRAVENOUS; SUBCUTANEOUS at 05:10

## 2024-10-09 RX ADMIN — CYCLOBENZAPRINE HYDROCHLORIDE 5 MG: 10 TABLET, FILM COATED ORAL at 11:50

## 2024-10-09 RX ADMIN — SODIUM CHLORIDE, SODIUM LACTATE, POTASSIUM CHLORIDE, AND CALCIUM CHLORIDE: 600; 310; 30; 20 INJECTION, SOLUTION INTRAVENOUS at 05:05

## 2024-10-09 RX ADMIN — INSULIN LISPRO 4 UNITS: 100 INJECTION, SOLUTION INTRAVENOUS; SUBCUTANEOUS at 09:10

## 2024-10-09 RX ADMIN — ACETAMINOPHEN 650 MG: 325 TABLET ORAL at 18:54

## 2024-10-09 RX ADMIN — ACETAMINOPHEN 650 MG: 325 TABLET ORAL at 22:27

## 2024-10-09 RX ADMIN — OXYCODONE 10 MG: 5 TABLET ORAL at 02:25

## 2024-10-09 RX ADMIN — SPIRONOLACTONE 12.5 MG: 25 TABLET ORAL at 08:21

## 2024-10-09 RX ADMIN — TOPIRAMATE 50 MG: 25 TABLET, FILM COATED ORAL at 08:21

## 2024-10-09 RX ADMIN — CLOPIDOGREL BISULFATE 75 MG: 75 TABLET ORAL at 08:21

## 2024-10-09 RX ADMIN — GABAPENTIN 300 MG: 300 CAPSULE ORAL at 08:20

## 2024-10-09 RX ADMIN — GABAPENTIN 300 MG: 300 CAPSULE ORAL at 21:00

## 2024-10-09 RX ADMIN — CYCLOBENZAPRINE HYDROCHLORIDE 5 MG: 10 TABLET, FILM COATED ORAL at 00:22

## 2024-10-09 RX ADMIN — TAMSULOSIN HYDROCHLORIDE 0.4 MG: 0.4 CAPSULE ORAL at 08:21

## 2024-10-09 RX ADMIN — INSULIN LISPRO 12 UNITS: 100 INJECTION, SOLUTION INTRAVENOUS; SUBCUTANEOUS at 18:18

## 2024-10-09 RX ADMIN — TOPIRAMATE 50 MG: 25 TABLET, FILM COATED ORAL at 21:00

## 2024-10-09 RX ADMIN — BISACODYL 5 MG: 5 TABLET, COATED ORAL at 08:21

## 2024-10-09 RX ADMIN — SODIUM CHLORIDE, PRESERVATIVE FREE 10 ML: 5 INJECTION INTRAVENOUS at 21:01

## 2024-10-09 RX ADMIN — POLYETHYLENE GLYCOL 3350 17 G: 17 POWDER, FOR SOLUTION ORAL at 08:22

## 2024-10-09 RX ADMIN — LISINOPRIL 20 MG: 20 TABLET ORAL at 08:21

## 2024-10-09 RX ADMIN — OXYCODONE 5 MG: 5 TABLET ORAL at 08:20

## 2024-10-09 RX ADMIN — CLONIDINE HYDROCHLORIDE 0.1 MG: 0.1 TABLET ORAL at 08:21

## 2024-10-09 RX ADMIN — METOPROLOL SUCCINATE 25 MG: 25 TABLET, EXTENDED RELEASE ORAL at 08:21

## 2024-10-09 RX ADMIN — ISOSORBIDE MONONITRATE 60 MG: 60 TABLET, EXTENDED RELEASE ORAL at 08:21

## 2024-10-09 ASSESSMENT — PAIN SCALES - GENERAL
PAINLEVEL_OUTOF10: 9
PAINLEVEL_OUTOF10: 3
PAINLEVEL_OUTOF10: 4
PAINLEVEL_OUTOF10: 8
PAINLEVEL_OUTOF10: 7
PAINLEVEL_OUTOF10: 3
PAINLEVEL_OUTOF10: 8

## 2024-10-09 ASSESSMENT — PAIN DESCRIPTION - LOCATION
LOCATION: BACK

## 2024-10-09 ASSESSMENT — PAIN DESCRIPTION - ONSET
ONSET: ON-GOING
ONSET: ON-GOING

## 2024-10-09 ASSESSMENT — PAIN DESCRIPTION - ORIENTATION
ORIENTATION: MID;LOWER
ORIENTATION: MID
ORIENTATION: MID;LOWER

## 2024-10-09 ASSESSMENT — PAIN DESCRIPTION - DESCRIPTORS
DESCRIPTORS: ACHING

## 2024-10-09 ASSESSMENT — PAIN - FUNCTIONAL ASSESSMENT
PAIN_FUNCTIONAL_ASSESSMENT: PREVENTS OR INTERFERES SOME ACTIVE ACTIVITIES AND ADLS
PAIN_FUNCTIONAL_ASSESSMENT: PREVENTS OR INTERFERES SOME ACTIVE ACTIVITIES AND ADLS

## 2024-10-09 ASSESSMENT — PAIN DESCRIPTION - PAIN TYPE
TYPE: SURGICAL PAIN
TYPE: SURGICAL PAIN

## 2024-10-09 ASSESSMENT — PAIN DESCRIPTION - FREQUENCY
FREQUENCY: CONTINUOUS
FREQUENCY: INTERMITTENT

## 2024-10-09 NOTE — PROGRESS NOTES
Progress Note POD #      Patient: Jj Landon               Sex: male          DOA: 10/8/2024         YOB: 1950      Surgery: Procedure(s):  LUMBAR 2- SACRAL 1 DECOMPRESSION FUSION WITH C-ARM OP 23 \"SPEC POP\"           LOS: 1 day               Subjective:     No new complaints  Some Right thigh pain    Objective:      Visit Vitals  /68   Pulse 86   Temp 98.2 °F (36.8 °C) (Oral)   Resp 14   Ht 1.753 m (5' 9\")   Wt 107.5 kg (236 lb 14.4 oz)   SpO2 98%   BMI 34.98 kg/m²       Physical Exam:  Neurological: motor strength: 5/5 in lower extremities bilaterally                          sensation: intact to light touch      Intake and Output:  Current Shift:  No intake/output data recorded.  Last three shifts:  10/07 1901 - 10/09 0700  In: 1675 [P.O.:150; I.V.:1400]  Out: 1820 [Urine:1450; Drains:120]      Lab/Data Reviewed:  Lab Results   Component Value Date/Time    WBC 7.4 07/31/2024 03:32 PM    HGB 11.1 10/09/2024 03:34 AM    HCT 35.4 10/09/2024 03:34 AM     07/31/2024 03:32 PM    MCV 92.6 07/31/2024 03:32 PM     No results found for: \"APTT\"  No results found for: \"INR\", \"PT1\"   Recent Labs     10/09/24  0334   HGB 11.1*           Assessment/Plan     Principal Problem:    Spinal stenosis, lumbar region with neurogenic claudication  Resolved Problems:    * No resolved hospital problems. *      1. Stable  2. OOB with PT  3. D/C Planning--patient would like [facility]  4. D/C PCA  5. D/C meraz  6. D/C drain & change dressing prior to discharge home.  7.Discharge to home after being cleared by P.T  8. Follow-up in 10 days at [facility] with Dr. Goldman.

## 2024-10-09 NOTE — PROGRESS NOTES
1944- TRANSFER - IN REPORT:    Verbal report received from AISSATOU Conway on Jj Landon  being received from PACU for routine post-op      Report consisted of patient's Situation, Background, Assessment and   Recommendations(SBAR).     Information from the following report(s) Nurse Handoff Report, Adult Overview, Surgery Report, Intake/Output, and MAR was reviewed with the receiving nurse.    Opportunity for questions and clarification was provided.      Assessment completed upon patient's arrival to unit and care assumed.      1918- Transferred in from PACU accompanied by 2 nurses via bed. Patient A&Ox4, 02 at 4LPM. Denies chest pain and SOB. With abd pad, gauze and tape  dressing to low mid back C/D/I.  Denies numbness/tingling/calf pain.   With compression device bilaterally.Hemovac drain intact and draining to a bloody fluid. Pt educated on unit routine, IS use, q2h rounds, and pain management. Pt verbalized understanding, no concerns voiced. Call bell and telephone within reach, bed in lowest position. Pt encouraged to call for assistance via call bell/zone phone. Dilaudid PCA verified with AISSATOU Conway.

## 2024-10-09 NOTE — PROGRESS NOTES
Physical Therapy Goals:  Initiated 10/9/2024 to be met within 7-10 days.  Short Term Goals  Short Term Goal 1: Patient will perform log rolling and supine to/from sit with minimal assist.  Short Term Goal 2: Patient will perform sit to/from stand transfers with minimal assist with rolling walker in prep for gait activity.  Short Term Goal 3: Patient will ambulate 50 ft with minimal assist with rolling walker for increased functional mobility.    []  Patient has met MD cata metzger for d/c home   []  Recommend HH with 24 hour adult care   [x]  Benefit from additional acute PT session to address:  Functional mobility and ROM/motor performance deficits      PHYSICAL THERAPY EVALUATION    Patient: Jj Landon (73 y.o. male)  Date: 10/9/2024  Primary Diagnosis: Spondylolisthesis of lumbar region [M43.16]  Spinal stenosis, lumbar region with neurogenic claudication [M48.062]  Procedure(s) (LRB):  LUMBAR 2- SACRAL 1 DECOMPRESSION FUSION WITH C-ARM OP 23 \"SPEC POP\" (N/A) 1 Day Post-Op   Precautions: Fall Risk (LSO when standing/OOB), Spinal Precautions: No Bending, No Lifting, No Twisting  PLOF: amb with SPC    ASSESSMENT :  Based on the objective data described below, the patient presents s/p surgery as noted above, with moderate confusion which appeared to increase toward end of session, with decr'd ROM/motor performance BLE's, impairment in functional mobility with regard to bed mobility, transfers, gait, and with decr'd overall activity tolerance.  Pain also impacting function 8-9/10 pre, 6/10 post session.  Pt seen with OT for second set of skilled hands.  Pt found supine in bed with urinal in place and pillows beneath knees; hemovac drain in place at incision.  Pt educated in back precautions as appropriate, log rolling and use of LSO as appropriate.  Pt required max assist log rolling L/R with vc/tc/vs.  Pt completes transition to sit EOB from L SL with max assist/additional time and cues as noted above.  PT eval)  Transfers:   Transfer Training  Transfer Training: No  Balance:   Balance  Sitting: Intact  Wheelchair Mobility:   Wheelchair Management  Wheelchair Management: No  Ambulation/Gait Training:  Gait  Gait Training: No  Pain:  Pain level pre-treatment: 8-9/10   Pain level post-treatment: 6/10   Pain Intervention(s): Medication (see MAR); Rest, Ice, Repositioning  Response to intervention: Nurse notified, See doc flow    Activity Tolerance:   Activity Tolerance: Patient tolerated evaluation without incident;Patient limited by endurance;Patient limited by pain;Treatment limited secondary to decreased cognition  Please refer to the flowsheet for vital signs taken during this treatment.    After treatment:   []         Patient left in no apparent distress sitting up in chair  [x]         Patient left in no apparent distress in bed  [x]         Call bell left within reach  [x]         Nursing notified  []         Caregiver present  []         Bed alarm activated  []         SCDs applied    COMMUNICATION/EDUCATION:   Patient Education  Education Given To: Patient  Education Provided: Role of Therapy;Plan of Care (Bed mobility)  Education Method: Verbal  Barriers to Learning: Cognition  Education Outcome: Verbalized understanding;Demonstrated understanding;Continued education needed    Thank you for this referral.  Edvin Adam, PT  Minutes: 45      Eval Complexity: Decision Making: Medium Complexity

## 2024-10-09 NOTE — CARE COORDINATION
Plan is for SNF, Referrals initiated in [facility]. Per RN  the pt is becoming confused. SW presented to bedside to discuss dispo plan with pts wife however, she was not present.    SW placed call to pts spouse Angie Landon [phone] however, received no answer. SW to follow up with pts spouse in the morning to discuss SNF.    Lisette Dye, MSHARLEEN  Case Management Department

## 2024-10-09 NOTE — PERIOP NOTE
TRANSFER - OUT REPORT:    Verbal report given to AISSATOU Martini on Jj Landon  being transferred to [address] [place] [address] for routine progression of patient care       Report consisted of patient's Situation, Background, Assessment and   Recommendations(SBAR).     Information from the following report(s) Adult Overview, Surgery Report, Intake/Output, and MAR was reviewed with the receiving nurse.           Lines:   Peripheral IV 10/08/24 Posterior;Right Hand (Active)   Site Assessment Clean, dry & intact 10/08/24 1840   Line Status Infusing 10/08/24 1840   Line Care Cap changed 10/08/24 1141   Phlebitis Assessment No symptoms 10/08/24 1840   Infiltration Assessment 0 10/08/24 1840   Alcohol Cap Used Yes 10/08/24 1141   Dressing Status Clean, dry & intact 10/08/24 1840   Dressing Type Transparent 10/08/24 1840   Dressing Intervention New 10/08/24 1141        Opportunity for questions and clarification was provided.      Patient transported with:  Registered Nurse

## 2024-10-09 NOTE — PROGRESS NOTES
Notified by PT/OT back brace that was given to patient from Dr. Goldman's office does not fit appropriately. Called Dr. Goldman's DARIELA Zarco and left vm for her to give a call back pertaining to the brace.   1530: At this time patient unable to urinate. Spoke with DARIELA Zarco pertaining to back brace and inability to urinate. Carolee recommending to straight cath patient and to monitor for urination.   1600: Upon entering room to straight cath patient. Patient has currently urinated on sheets. Amount unable to measure, changed patient linen and applied external catheter to patient.

## 2024-10-09 NOTE — PLAN OF CARE
Problem: Safety - Adult  Goal: Free from fall injury  Outcome: Progressing     Problem: Pain  Goal: Verbalizes/displays adequate comfort level or baseline comfort level  Outcome: Progressing     Problem: Chronic Conditions and Co-morbidities  Goal: Patient's chronic conditions and co-morbidity symptoms are monitored and maintained or improved  Outcome: Progressing     Problem: Discharge Planning  Goal: Discharge to home or other facility with appropriate resources  Outcome: Progressing     Problem: ABCDS Injury Assessment  Goal: Absence of physical injury  Outcome: Progressing

## 2024-10-09 NOTE — PROGRESS NOTES
10/9/2024 PT note: consult received and chart reviewed.  Evaluation attempted. Pt in bed, attempting to void in urinal and requesting additional time for same.  Will f/u at later time as pt schedule allows for PT evaluation. Thank you for this referral.   Chilo, PT

## 2024-10-09 NOTE — DISCHARGE INSTRUCTIONS
Dr. Goldman’s Post-Operative Instructions Spine Fusion    *YOU MUST AVOID SMOKING OR BEING AROUND ANYONE WHO SMOKES. AVOID ALL PRODUCTS THAT CONTAIN NICOTINE. DO NOT TAKE IBUPROFEN OR ANTI--INFLAMMATORIES, AS THESE MAY ALTER THE HEALING OF THE FUSION.    ACTIVITIES :  *The first week after surgery   1. You may be up and walking about the house.   2.  Activities around the house, such as washing dishes, fixing light meals, and your own personal care are fine.   3.  Avoid strenuous activities, such as vacuuming, lifting laundry or grocery bags.   4.  Do not lift anything heavier than 1 gallon of milk (or about 5-8 pounds).   5.  Do not bend over to  items from the ground level until 3 months post-op.  *Week 2 and beyond  1.  You may gradually increase your activities, but still avoid heavy lifting, pushing/pulling.   2.  Walking is the best way to rebuild strength and stamina. Start SLOWLY and gradually increase the distance a little every week.   3.  Walk at a pace that avoids fatigue or severe pain. Do not try to walk several blocks the first day! As you increase the distance, you may feel tired. If so, stop and rest.   4.  You should be able to walk several blocks by your first clinic visit.  5.  Follow-up with Dr. Goldman in 10-14 days.    BATHING and INCISION CARE:  1. The incision may be tender to touch or feel numb: this is normal.   2.  Keep the incision clean and dry. Do not get incision wet for 5 days. The incision will be closed with sutures under the skin and the skin will be glued.   3.  Do not apply any lotions, ointments or oils on the incision.   4.  If you notice any excessive swelling, redness, or persistent drainage around the incision, notify the office immediately.    DRIVIN.  You should not drive until after your follow-up appointment.   2.  You can be in a vehicle for short distances, but if you travel any long distance, please stop about every 30 minutes and walk/stretch.   3.   You should NEVER drive while taking narcotic medication.    RETURN TO WORK :  1. The decision to return to work will be determined on an individual basis.   2.  Many people who have a strenuous job (construction, heavy labor, etc) may need to be off work for up to 12 weeks.   3.  If you need a work note, please let us know as soon as possible, and not the same day you are planning to return to work.        NUTRITION :  1.  Good nutrition is an essential part of healing.   2.  You should eat a balanced diet each day, including fruits, vegetables, dairy products and protein.   3.  Remember to drink plenty of water.   4.  If you have not had a bowel movement within 3 days of surgery, you will need to use a laxative or suppository that can be obtained over-the-counter at your local pharmacy.    BONE STIMULATOR:  1. A spinal bone stimulator may be prescribed for you under certain situations.  2.  A nurse will call you if one has been requested and discuss its use for approximately 4-6 months post-op every day.  3.  This device assists in bone healing and fusion.    MEDICATIONS -  1. You may resume the medications you were taking before surgery, with the general exception of (or DO NOT TAKE) non-steroidal anti-inflammatory medications, such as Motrin, Aleve, Advil Naprosyn, Ibuprofen or aspirin.   2.  You will receive a prescription for pain medication at discharge from the hospital. The pain medication works best if taken before the pain becomes severe.   3.  To reduce stomach upset, always take the medication with food.   4.  Begin to wean yourself off the pain medication during the second week after discharge.   5.  If you need a refill, please call the office during working hours at least 2 days before your prescription runs out. Do not wait until your bottle is empty to call for a refill.   6.  DO NOT drive if you are taking narcotic pain medications.    HOME HEALTH CARE:  1.   A home health care service has been

## 2024-10-09 NOTE — PROGRESS NOTES
Occupational Therapy Goals:  Initiated 10/9/2024 to be met within 7-10 days.  Short Term Goals  Time Frame for Short Term Goals: 7 days  Short Term Goal 1: Patient will complete grooming at sink with supervision  Short Term Goal 2: Patient will complete bathing with supervision while adhering to back precautions  Short Term Goal 3: Patient will complete toileting with supervision  Short Term Goal 4: Patient will complete LBD with supervision and adaptive equipment as needed while adhering to back precautions  Short Term Goal 5: Patient will don/doff LSO with setup A while adhering to back precautions    OCCUPATIONAL THERAPY EVALUATION/TREATMENT    Patient: Jj Landon (73 y.o. male)  Date: 10/9/2024  Primary Diagnosis: Spondylolisthesis of lumbar region [M43.16]  Spinal stenosis, lumbar region with neurogenic claudication [M48.062]  Procedure(s) (LRB):  LUMBAR 2- SACRAL 1 DECOMPRESSION FUSION WITH C-ARM OP 23 \"SPEC POP\" (N/A) 1 Day Post-Op   Precautions: Fall Risk (LSO when standing/walking),  ,  ,  ,  , Spinal Precautions: No Bending, No Lifting, No Twisting,  ,    PLOF: Patient used SPC    ASSESSMENT :  RN cleared patient for participation in OT evaluation. Patient presented supine in bed with gripper socks and drain. Patient with no visitors present for entire session. Patient completed assessment of ADLs and BUE strength, ROM (see details below). Due to deficits (listed below) patient has decreased independence with ADLs and functional mobility and would benefit from continued occupational therapy services.  ,    TREATMENT:  Patient was seen with Physical Therapy for second set of skilled hands. Patient was educated on back precautions, verbalized understanding.   Patient educated on log rolling. Patient completed supine to sit, additional time required for all mobility. Patient educated on LSO don, LSO not appropriately sized for patient (too small) brace not able to be adjusted to larger size(already at max  Assessment:      Feeding: Independent  Grooming: Stand by assistance  UE Bathing: Moderate assistance  LE Bathing: Dependent/Total  UE Dressing: Maximum assistance  LE Dressing: Dependent/Total  Toileting: Dependent/Total    ADL Intervention:    Patient provided adaptive equipment for lower body dressing/bathing including reacher, sock aid, long handled shoe horn, and long handled sponge. Patient educated on reacher and sock aid while seated EOB. Patient educated on use of reacher  to doff socks, completed with minimum assist. Patient educated on use of sock aid to don socks, completed with maximum assist, patient unable to keep R foto off floor for any period of time long enough to effectively utilize sock aid. Patient unable to tolerate further education on AE at this time d/t pain in back.    Pain:  Pain level pre-treatment: 8/10   Pain level post-treatment: 6/10   Pain Intervention(s): Rest, Ice, Repositioning   Response to intervention: Nurse notified    Activity Tolerance:   Activity Tolerance: Patient limited by pain, Treatment limited secondary to decreased cognition  Please refer to the flowsheet for vital signs taken during this treatment.    After treatment:   Safety Devices  Type of Devices: Left in bed, Call light within reach, Nurse notified   COMMUNICATION/EDUCATION:   Patient Education  Education Given To: Patient  Education Provided: Role of Therapy;Transfer Training;Plan of Care;ADL Adaptive Strategies;Mobility Training;Fall Prevention Strategies;Precautions;Orientation;Equipment  Education Method: Verbal  Barriers to Learning: Cognition  Education Outcome: Continued education needed    Thank you for this referral.  SARITA Patel, OTR/L  Minutes: 45    Eval Complexity: Decision Making: Medium Complexity

## 2024-10-09 NOTE — PROGRESS NOTES
Occupational Therapy Evaluation/Treatment Attempt    Chart reviewed. Attempted Occupational Therapy Evaluation/Treatment, however, patient unable to be seen due to:  []  Nausea/vomiting  [x]  Eating -1009  []  Pain  []  Patient too lethargic  []  Off Unit for testing/procedure  []  Dialysis treatment in progress   []  Telemetry Results  []  WB status not in order set  []  Patient on bedrest per order set  [x]  Other : Using urinal at bed level, requesting privacy to complete -1155    Will follow up later as patient's schedule allows.   Thank you for this referral.  Marjorie Lazo OTVIRIDIANA, OTR/L

## 2024-10-09 NOTE — CARE COORDINATION
SW informed of pts spouse request for ARU. Pt with discharge orders in place and pre-arranged HH for anticipated Home plan.     SW presented to bedside, introduced self and explained role. SW informed pt that PT/OT recs determine plan.     In the event PT/OT recommend ARU, this writer can explore that disposition and pt will require an auth. If home with HH is recommended, [facility] can accept pending a HH order.    Lisette Dye, MSW  Case Management Department

## 2024-10-09 NOTE — CARE COORDINATION
10/09/24 0832   /Social Work Whiteboard Notes   /Social Work Whiteboard YELLOW 10/9 Consult obtained for [facility] SUSAN PERES sent referral in [facility]. SUSAN to follow up       CAREN Yang  Case Management Department

## 2024-10-09 NOTE — PROGRESS NOTES
Bedside and Verbal shift change report given to AISSATOU French (oncoming nurse) by AISSATOU Martini (offgoing nurse). Report included the following information Nurse Handoff Report, Adult Overview, Surgery Report, Intake/Output, MAR, and Recent Results.

## 2024-10-10 ENCOUNTER — APPOINTMENT (OUTPATIENT)
Facility: HOSPITAL | Age: 74
DRG: 448 | End: 2024-10-10
Attending: ORTHOPAEDIC SURGERY
Payer: MEDICARE

## 2024-10-10 PROBLEM — D50.9 IRON DEFICIENCY ANEMIA: Status: ACTIVE | Noted: 2024-10-10

## 2024-10-10 PROBLEM — G47.33 OSA (OBSTRUCTIVE SLEEP APNEA): Status: ACTIVE | Noted: 2024-10-10

## 2024-10-10 PROBLEM — N18.30 STAGE 3 CHRONIC KIDNEY DISEASE (HCC): Status: ACTIVE | Noted: 2024-10-10

## 2024-10-10 PROBLEM — R55 SYNCOPE: Status: ACTIVE | Noted: 2024-10-10

## 2024-10-10 PROBLEM — I25.10 CAD (CORONARY ARTERY DISEASE): Status: ACTIVE | Noted: 2024-10-10

## 2024-10-10 PROBLEM — E11.9 TYPE 2 DIABETES MELLITUS (HCC): Status: ACTIVE | Noted: 2024-10-10

## 2024-10-10 LAB
ALBUMIN SERPL-MCNC: 2.7 G/DL (ref 3.4–5)
ALBUMIN/GLOB SERPL: 0.8 (ref 0.8–1.7)
ALP SERPL-CCNC: 105 U/L (ref 45–117)
ALT SERPL-CCNC: 15 U/L (ref 16–61)
ANION GAP SERPL CALC-SCNC: 5 MMOL/L (ref 3–18)
ARTERIAL PATENCY WRIST A: POSITIVE
AST SERPL-CCNC: 41 U/L (ref 10–38)
BASE EXCESS BLD CALC-SCNC: 1 MMOL/L
BASOPHILS # BLD: 0 K/UL (ref 0–0.1)
BASOPHILS NFR BLD: 0 % (ref 0–2)
BDY SITE: ABNORMAL
BILIRUB SERPL-MCNC: 0.4 MG/DL (ref 0.2–1)
BUN SERPL-MCNC: 20 MG/DL (ref 7–18)
BUN/CREAT SERPL: 11 (ref 12–20)
CALCIUM SERPL-MCNC: 8.3 MG/DL (ref 8.5–10.1)
CHLORIDE SERPL-SCNC: 106 MMOL/L (ref 100–111)
CO2 SERPL-SCNC: 26 MMOL/L (ref 21–32)
CREAT SERPL-MCNC: 1.84 MG/DL (ref 0.6–1.3)
D DIMER PPP FEU-MCNC: 1.22 UG/ML(FEU)
DIFFERENTIAL METHOD BLD: ABNORMAL
EOSINOPHIL # BLD: 0.2 K/UL (ref 0–0.4)
EOSINOPHIL NFR BLD: 2 % (ref 0–5)
ERYTHROCYTE [DISTWIDTH] IN BLOOD BY AUTOMATED COUNT: 12.9 % (ref 11.6–14.5)
GAS FLOW.O2 O2 DELIVERY SYS: ABNORMAL
GLOBULIN SER CALC-MCNC: 3.3 G/DL (ref 2–4)
GLUCOSE BLD STRIP.AUTO-MCNC: 221 MG/DL (ref 70–110)
GLUCOSE BLD STRIP.AUTO-MCNC: 232 MG/DL (ref 70–110)
GLUCOSE BLD STRIP.AUTO-MCNC: 257 MG/DL (ref 70–110)
GLUCOSE BLD STRIP.AUTO-MCNC: 275 MG/DL (ref 70–110)
GLUCOSE BLD STRIP.AUTO-MCNC: 281 MG/DL (ref 70–110)
GLUCOSE SERPL-MCNC: 249 MG/DL (ref 74–99)
HCO3 BLD-SCNC: 25.3 MMOL/L (ref 21–28)
HCT VFR BLD AUTO: 32 % (ref 36–48)
HCT VFR BLD AUTO: 34.5 % (ref 36–48)
HGB BLD-MCNC: 10.5 G/DL (ref 13–16)
HGB BLD-MCNC: 11.3 G/DL (ref 13–16)
IMM GRANULOCYTES # BLD AUTO: 0.1 K/UL (ref 0–0.04)
IMM GRANULOCYTES NFR BLD AUTO: 1 % (ref 0–0.5)
LYMPHOCYTES # BLD: 1.6 K/UL (ref 0.9–3.6)
LYMPHOCYTES NFR BLD: 13 % (ref 21–52)
MAGNESIUM SERPL-MCNC: 1.9 MG/DL (ref 1.6–2.6)
MCH RBC QN AUTO: 30.6 PG (ref 24–34)
MCHC RBC AUTO-ENTMCNC: 32.8 G/DL (ref 31–37)
MCV RBC AUTO: 93.5 FL (ref 78–100)
MONOCYTES # BLD: 0.9 K/UL (ref 0.05–1.2)
MONOCYTES NFR BLD: 8 % (ref 3–10)
NEUTS SEG # BLD: 9.3 K/UL (ref 1.8–8)
NEUTS SEG NFR BLD: 77 % (ref 40–73)
NRBC # BLD: 0 K/UL (ref 0–0.01)
NRBC BLD-RTO: 0 PER 100 WBC
PCO2 BLD: 38.3 MMHG (ref 35–48)
PH BLD: 7.43 (ref 7.35–7.45)
PLATELET # BLD AUTO: 255 K/UL (ref 135–420)
PMV BLD AUTO: 9.5 FL (ref 9.2–11.8)
PO2 BLD: 89 MMHG (ref 83–108)
POTASSIUM SERPL-SCNC: 4.4 MMOL/L (ref 3.5–5.5)
PROT SERPL-MCNC: 6 G/DL (ref 6.4–8.2)
RBC # BLD AUTO: 3.69 M/UL (ref 4.35–5.65)
SAO2 % BLD: 97.1 % (ref 92–97)
SERVICE CMNT-IMP: ABNORMAL
SODIUM SERPL-SCNC: 137 MMOL/L (ref 136–145)
SPECIMEN TYPE: ABNORMAL
TROPONIN I SERPL HS-MCNC: 26 NG/L (ref 0–78)
WBC # BLD AUTO: 12.2 K/UL (ref 4.6–13.2)

## 2024-10-10 PROCEDURE — 83735 ASSAY OF MAGNESIUM: CPT

## 2024-10-10 PROCEDURE — 36600 WITHDRAWAL OF ARTERIAL BLOOD: CPT

## 2024-10-10 PROCEDURE — 6370000000 HC RX 637 (ALT 250 FOR IP): Performed by: PHYSICIAN ASSISTANT

## 2024-10-10 PROCEDURE — 80053 COMPREHEN METABOLIC PANEL: CPT

## 2024-10-10 PROCEDURE — 82962 GLUCOSE BLOOD TEST: CPT

## 2024-10-10 PROCEDURE — 85025 COMPLETE CBC W/AUTO DIFF WBC: CPT

## 2024-10-10 PROCEDURE — 36415 COLL VENOUS BLD VENIPUNCTURE: CPT

## 2024-10-10 PROCEDURE — 97110 THERAPEUTIC EXERCISES: CPT

## 2024-10-10 PROCEDURE — 93005 ELECTROCARDIOGRAM TRACING: CPT | Performed by: HOSPITALIST

## 2024-10-10 PROCEDURE — 2580000003 HC RX 258: Performed by: PHYSICIAN ASSISTANT

## 2024-10-10 PROCEDURE — 3430000000 HC RX DIAGNOSTIC RADIOPHARMACEUTICAL: Performed by: HOSPITALIST

## 2024-10-10 PROCEDURE — 70450 CT HEAD/BRAIN W/O DYE: CPT

## 2024-10-10 PROCEDURE — 84484 ASSAY OF TROPONIN QUANT: CPT

## 2024-10-10 PROCEDURE — 85379 FIBRIN DEGRADATION QUANT: CPT

## 2024-10-10 PROCEDURE — 85018 HEMOGLOBIN: CPT

## 2024-10-10 PROCEDURE — A9540 TC99M MAA: HCPCS | Performed by: HOSPITALIST

## 2024-10-10 PROCEDURE — 85014 HEMATOCRIT: CPT

## 2024-10-10 PROCEDURE — 82803 BLOOD GASES ANY COMBINATION: CPT

## 2024-10-10 PROCEDURE — 97530 THERAPEUTIC ACTIVITIES: CPT

## 2024-10-10 PROCEDURE — 1100000003 HC PRIVATE W/ TELEMETRY

## 2024-10-10 RX ORDER — OXYCODONE AND ACETAMINOPHEN 5; 325 MG/1; MG/1
1-1.5 TABLET ORAL EVERY 6 HOURS PRN
Qty: 56 TABLET | Refills: 0 | Status: SHIPPED | OUTPATIENT
Start: 2024-10-10 | End: 2024-10-10

## 2024-10-10 RX ORDER — OXYCODONE AND ACETAMINOPHEN 5; 325 MG/1; MG/1
1-1.5 TABLET ORAL EVERY 6 HOURS PRN
Qty: 56 TABLET | Refills: 0 | Status: SHIPPED | OUTPATIENT
Start: 2024-10-10 | End: 2024-10-17

## 2024-10-10 RX ADMIN — GABAPENTIN 300 MG: 300 CAPSULE ORAL at 20:23

## 2024-10-10 RX ADMIN — TOPIRAMATE 50 MG: 25 TABLET, FILM COATED ORAL at 20:23

## 2024-10-10 RX ADMIN — ISOSORBIDE MONONITRATE 60 MG: 60 TABLET, EXTENDED RELEASE ORAL at 08:34

## 2024-10-10 RX ADMIN — TOPIRAMATE 50 MG: 25 TABLET, FILM COATED ORAL at 08:34

## 2024-10-10 RX ADMIN — INSULIN LISPRO 8 UNITS: 100 INJECTION, SOLUTION INTRAVENOUS; SUBCUTANEOUS at 08:41

## 2024-10-10 RX ADMIN — SODIUM CHLORIDE, PRESERVATIVE FREE 10 ML: 5 INJECTION INTRAVENOUS at 09:12

## 2024-10-10 RX ADMIN — TAMSULOSIN HYDROCHLORIDE 0.4 MG: 0.4 CAPSULE ORAL at 08:34

## 2024-10-10 RX ADMIN — LISINOPRIL 20 MG: 20 TABLET ORAL at 08:34

## 2024-10-10 RX ADMIN — BISACODYL 5 MG: 5 TABLET, COATED ORAL at 08:34

## 2024-10-10 RX ADMIN — INSULIN LISPRO 8 UNITS: 100 INJECTION, SOLUTION INTRAVENOUS; SUBCUTANEOUS at 17:45

## 2024-10-10 RX ADMIN — INSULIN LISPRO 4 UNITS: 100 INJECTION, SOLUTION INTRAVENOUS; SUBCUTANEOUS at 12:14

## 2024-10-10 RX ADMIN — OXYCODONE 10 MG: 5 TABLET ORAL at 08:34

## 2024-10-10 RX ADMIN — ACETAMINOPHEN 650 MG: 325 TABLET ORAL at 03:39

## 2024-10-10 RX ADMIN — OXYCODONE 10 MG: 5 TABLET ORAL at 16:46

## 2024-10-10 RX ADMIN — GABAPENTIN 300 MG: 300 CAPSULE ORAL at 08:34

## 2024-10-10 RX ADMIN — KIT FOR THE PREPARATION OF TECHNETIUM TC 99M ALBUMIN AGGREGATED 7.2 MILLICURIE: 2.5 INJECTION, POWDER, FOR SOLUTION INTRAVENOUS at 12:58

## 2024-10-10 RX ADMIN — CLOPIDOGREL BISULFATE 75 MG: 75 TABLET ORAL at 08:34

## 2024-10-10 RX ADMIN — CLONIDINE HYDROCHLORIDE 0.1 MG: 0.1 TABLET ORAL at 08:35

## 2024-10-10 RX ADMIN — SPIRONOLACTONE 12.5 MG: 25 TABLET ORAL at 08:34

## 2024-10-10 RX ADMIN — SODIUM CHLORIDE, PRESERVATIVE FREE 10 ML: 5 INJECTION INTRAVENOUS at 20:23

## 2024-10-10 RX ADMIN — METOPROLOL SUCCINATE 25 MG: 25 TABLET, EXTENDED RELEASE ORAL at 08:34

## 2024-10-10 ASSESSMENT — PAIN SCALES - GENERAL
PAINLEVEL_OUTOF10: 4
PAINLEVEL_OUTOF10: 7
PAINLEVEL_OUTOF10: 5
PAINLEVEL_OUTOF10: 7
PAINLEVEL_OUTOF10: 6

## 2024-10-10 ASSESSMENT — PAIN DESCRIPTION - ORIENTATION
ORIENTATION: MID
ORIENTATION: MID
ORIENTATION: MID;LOWER

## 2024-10-10 ASSESSMENT — PAIN DESCRIPTION - DESCRIPTORS
DESCRIPTORS: SORE;THROBBING;ACHING
DESCRIPTORS: ACHING
DESCRIPTORS: SHARP

## 2024-10-10 ASSESSMENT — PAIN DESCRIPTION - PAIN TYPE: TYPE: SURGICAL PAIN

## 2024-10-10 ASSESSMENT — PAIN DESCRIPTION - LOCATION
LOCATION: BACK;LEG
LOCATION: BACK
LOCATION: BACK

## 2024-10-10 NOTE — PROGRESS NOTES
0940: Patient wanted to get out of bed to attempt to have BM on bedside commode. Patient was transported from bed to bedside commode where he attempted to have a BM. Patient wanted privacy and was instructed to press call bell when he was done. Patient pressed call bell after sometime stating \" my leg hurts and I would like to get in chair I am done\" within a few minutes of this RN going down to assist patient off bedside commode. He became unresponsive, and RRT was called with Assisting Rns at bedside with this RN. Following RRT labs were drawn, EKG completed and upon discussion with Hospitalist patient will be moving upstairs to tele unit.  This RN has reached out to surgery team member Carolee LYLE to notify her of patients condition. At this time waiting for a response.     Spoke with Carolee LYLE, she is aware of patients condition change.     Report to Bobbi REYEZ on [facility]. Patient to go to CT first prior to going to unit. Called Wife to inform her of patients room change.

## 2024-10-10 NOTE — NURSE NAVIGATOR
Mobility Intervention:       [x] Pt dangled at edge of bed    [x] Pt assisted OOB to bedside commode    [] Pt assisted OOB to chair    [] Pt ambulated to bathroom    [] Patient was ambulated in room/hallway    Assistive Device Utilized:       [x] Rolling walker   [] Crutches   [] Back Brace   [] Knee immobilizer   [] Neck Collar    After Rounding and Checking on Patient     [] Patient left in no apparent distress sitting up in chair  [] Patient left in no apparent distress in bed  [] Call bell left within reach  [] SCDs on both legs & machine turned on  [] Knee immobilizer on  [] Ice applied  [] RN notified  []  present  [] Bed alarm activated    Reason patient not mobilized:      [] Patient refused   [] Nausea/vomiting   [] Low blood pressure   [] Drowsy/lethargic      Left patient with call light, cell phone and personal belongings in reach for safety.

## 2024-10-10 NOTE — PROGRESS NOTES
PT session held due to:  [x]  RRT and orders to transfer to higher level   []  RN Communication/ suggestion  []  Extreme Pain  []  Dialysis treatment in progress.  Will f/u, when medically cleared to resume.. Thank you.  Romero Carreon, PTA

## 2024-10-10 NOTE — PROGRESS NOTES
Progress Note POD #2      Patient: Jj Landon               Sex: male          DOA: 10/8/2024         YOB: 1950      Surgery: Procedure(s):  LUMBAR 2- SACRAL 1 DECOMPRESSION FUSION WITH C-ARM OP 23 \"SPEC POP\"           LOS: 2 days               Subjective:     No new complaints    Objective:      Visit Vitals  BP (!) 158/82   Pulse 92   Temp 99.8 °F (37.7 °C) (Oral)   Resp 16   Ht 1.753 m (5' 9\")   Wt 107.5 kg (236 lb 14.4 oz)   SpO2 95%   BMI 34.98 kg/m²       Physical Exam:  Neurological: motor strength: 5/5 in lower extremities bilaterally                          sensation: intact to light touch  Patient mobility  Functional Mobility:  Bed Mobility:   Bed Mobility Training  Bed Mobility Training: Yes  Interventions: Safety awareness training;Tactile cues;Verbal cues;Visual cues  Rolling: Maximum assistance  Supine to Sit: Maximum assistance  Sit to Supine: Maximum assistance;Assist X2  Scooting:  (see PT eval)  Transfers:   Transfer Training  Transfer Training: No  Balance:   Balance  Sitting: Intact  Wheelchair Mobility:   Wheelchair Management  Wheelchair Management: No  Ambulation/Gait Training:  Gait  Gait Training: No  Intake and Output:  Current Shift:  No intake/output data recorded.  Last three shifts:  10/08 1901 - 10/10 0700  In: 1747.3 [P.O.:150; I.V.:1597.3]  Out: 2720 [Urine:2350; Drains:370]      Lab/Data Reviewed:  Lab Results   Component Value Date/Time    WBC 7.4 07/31/2024 03:32 PM    HGB 10.5 10/10/2024 02:17 AM    HCT 32.0 10/10/2024 02:17 AM     07/31/2024 03:32 PM    MCV 92.6 07/31/2024 03:32 PM     No results found for: \"APTT\"  No results found for: \"INR\", \"PT1\"   Recent Labs     10/09/24  0334 10/10/24  0217   HGB 11.1* 10.5*           Assessment/Plan     Principal Problem (Resolved):    Spinal stenosis, lumbar region with neurogenic claudication  Active Problems:    * No active hospital problems. *      1. Stable  2. OOB with PT  3. D/C Planning- SNF

## 2024-10-10 NOTE — DISCHARGE SUMMARY
Discharge Summary    Patient: Jj Landon               Sex: male          DOA: 10/8/2024         YOB: 1950      Age:  73 y.o.        LOS:  LOS: 3 days                Admit Date: 10/8/2024    Discharge Date: 10/11/2024    Admission Diagnoses: Spondylolisthesis of lumbar region [M43.16]  Spinal stenosis, lumbar region with neurogenic claudication [M48.062]    Discharge Diagnoses:  Spondylolisthesis of lumbar region [M43.16]  Spinal stenosis, lumbar region with neurogenic claudication [M48.062]    Discharge Condition:  Fair    Hospital Course: The patient underwent L2-S1 decompression & fusion  on 10/8/2024. The patient tolerated the procedure well. Vital signs remained stable and the patient was transferred to  2nd floor surgical unit without complications. The patient remained neurovascularly intact and began getting out of bed with physical therapy on  POD #1. Pain was controlled with Dilaudid PCA and Percocet pills for break through pain. The PCA pump and meraz catheter were discontinued on POD#1. The drain was discontinued on POD#2.  The patient progressed slowly with physical therapy.   RRT occurred on POD #2 for syncope. He was later transferred to telemetry for continuous monitoring. The patient was slow to progress with physical therapy,standing only on POD #2.He still  required assistance to ambulate with poor balance, and is lacking assistance at home. SNF is the best option. The patient will require transportation to SNF and will be transferred when bed available. .   The patient had restarted Plavix for DVT prophylaxis on POD#1.    Consults: None    Discharge disposition:  SNF    Significant Diagnostic Studies:   Recent Labs     10/09/24  0334 10/10/24  0217   HGB 11.1* 10.5*     Recent Labs     10/09/24  0334 10/10/24  0217   HCT 35.4* 32.0*          Medication List        START taking these medications      oxyCODONE-acetaminophen 5-325 MG per tablet  Commonly known as:

## 2024-10-10 NOTE — CARE COORDINATION
SW presented to bedside however, pts wife not present. SW placed call to [facility] to discuss Dispo plan. Pts spouse requested [facility] however, SW explained that pt would benefit from SNF opposed to ARU. Pt spouse stated she will be at the hospital soon. SW ;eft SNF list at bedside and highlighted the accepting facilities.     SW to follow up with pts spouse in effort to obtain SNF preferences and initiate an auth today.    SW to follow.    CAREN Yang  Case Management Department

## 2024-10-10 NOTE — PROGRESS NOTES
1925-Bedside report received from AISSATOU French. Pt A & O X 4. Pain at 5, pt's says pain goal is 8.BP been high, team aware per RN.  Per RN, pt got loopy with flexeril, will try to avoid narcs.. Pt without any other issues. Call light within reach.  2015- Pt's son summoned me to the room to assist applying home pull ups. Noted that man wick tubings were in the trash. Son reports that he took it off as it was full, and that pt wants the pull-ups. Pt's lined all wet, pt cleaned and linen changed. Pt is a 2 assist to roll, v poor mobility Enc to use urinal.   IV site to   patent and intact. Pt A & O x 4. LS clear, on RA. Drsg to back CDI. + CSM. Pain at 4. + BS to all 4 quadrants. Pt denies nausea. Call light within reach. Pt denies any needs at this time.  0335-IS enc for low grade fevers.See Tylenol doses too.Pt now using the urinal without any issues.  0640-Pt is having lots of difficulty with mobilty, includes rolling in bed. Says he feels generally weak. Prefers to get up with PT later.  IS enc several times this shift, Gets it to 3000.    the patient laying comfortably in bed during BSSR. Educated on the importance of forcing movement. Day RN to reinforce same. No other needs expressed at this time.

## 2024-10-10 NOTE — CONSULTS
sleep apnea not on CPAP, diabetes was admitted to orthopedics service for spine fusion.  RRT called due to syncope episode.  Patient was posterior surgical, he went to bedside, lost consciousness.  RRT called.  Went to bedside to evaluate patient.  Patient is alert, orientated, denies any chest pain but reported epigastric pain.  He received narcotics at 8:00 this morning.  He did not remember what happened.  He reported he \"has brain cyst and have a shunt.\" He reported epigastric pain.     Past Medical History:   Diagnosis Date    Ambulates with cane 2024    Arthritis 2018    back    BPH (benign prostatic hyperplasia) 2021    Tamsulosin. [facility]    CAD (coronary artery disease) 2017    hx heart cath & stent \"2.25 by 16 mm Promus drug-eluting stent\" [facility] cardiologist Dr. Bar    Chronic kidney disease, stage 3 (HCC) 2017    [facility]ist Dr. 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 no specialist; managed by PCP Dr John    Type II diabetes mellitus (HCC) 1995    on meds. managed by PCP Dr. John    Wears prescription eyeglasses      Past Surgical History:   Procedure Laterality Date    CAPSULOTOMY Left 04/06/2023    Dr. Salazar    CARDIAC CATHETERIZATION  08/10/2020    Dr. Tomy Bar    CATARACT EXTRACTION, BILATERAL Bilateral 08/2021    Dr. Salazar    CENTRAL SHUNT  1984     shunt- \"Y' shunt added to previous shunt in 2009    COLONOSCOPY N/A 02/28/2019    Lianet Villalobos MD at [facility]COPY    CORONARY ANGIOPLASTY WITH STENT PLACEMENT  08/2020    \"2.25 by 16 mm Promus  tablet Take 1 tablet by mouth every morning Indications: hx stroke & stent      tamsulosin (FLOMAX) 0.4 MG capsule Take 1 capsule by mouth daily Indications: BPH        No Known Allergies        Review of Systems  Constitutional: No fever, chills, diaphoresis, malaise, fatigue or weight gain/loss or falls  Skin: no itching or rashes  HEENT: no ear discomfort, hearing loss, tinnitus, epistaxis or sore throat  EYES: no blurry vision, double vision or photophobia  CARDIOVASCULAR: no claudication, cp, palpitations, orthopnea, pnd or LE edema  PULMONARY: no cough, wheeze, shortness of breath or sputum production  GI: no nausea, vomiting, diarrhea, abdominal pain, melena, hematemesis or brbpr  : no dysuria, hematuria  MUSCULOSKELETAL: no back pain, joint pain or myalgias  ENDOCRINE: no heat/cold intolerance, polyuria or polydipsia  HEME: no easy bruising or bleeding  NEURO: no unilateral weakness, numbness, tingling or seizures.  +Syncope       Physical Exam:      Visit Vitals  /68   Pulse 83   Temp 98.6 °F (37 °C) (Oral)   Resp 12   Ht 1.753 m (5' 9\")   Wt 107.5 kg (236 lb 14.4 oz)   SpO2 95%   BMI 34.98 kg/m²     Body mass index is 34.98 kg/m².    Physical Exam:  GEN: well nourished, laying in bed in no acute distress  HEENT: atraumatic, nose normal,oropharynx clear, MMM  NECK: supple, trachea midline, no supraclavicular or submandibular adenopathy noted  EYES: conjuctiva normal, lids with out lesions, PERRL  HEART: RRR with out m/r/g, pmi nondisplaced, pulses 2+ distally  LUNGS: equal chest wall expansion, cta bl with out wheezes/rales or rhonchi  AB: soft, +BS, nt/nd no organomegaly. Palpable mass -chronic per pt reported   NEURO: alert, awake and oriented x3, gait not assessed, cranial nerves intact, strength 5/5 bl UE and LE, sensation intact, reflexes nonpathological, looks lethargic   SKIN: surgical site covered with gauze         Laboratory Studies:      Labs: Results:       Chemistry No results for

## 2024-10-11 VITALS
HEIGHT: 69 IN | DIASTOLIC BLOOD PRESSURE: 62 MMHG | HEART RATE: 76 BPM | OXYGEN SATURATION: 100 % | WEIGHT: 236.9 LBS | BODY MASS INDEX: 35.09 KG/M2 | TEMPERATURE: 98.9 F | RESPIRATION RATE: 16 BRPM | SYSTOLIC BLOOD PRESSURE: 128 MMHG

## 2024-10-11 LAB
GLUCOSE BLD STRIP.AUTO-MCNC: 203 MG/DL (ref 70–110)
GLUCOSE BLD STRIP.AUTO-MCNC: 211 MG/DL (ref 70–110)
GLUCOSE BLD STRIP.AUTO-MCNC: 242 MG/DL (ref 70–110)
GLUCOSE BLD STRIP.AUTO-MCNC: 285 MG/DL (ref 70–110)
HCT VFR BLD AUTO: 33.9 % (ref 36–48)
HGB BLD-MCNC: 10.9 G/DL (ref 13–16)
MAGNESIUM SERPL-MCNC: 2 MG/DL (ref 1.6–2.6)
TROPONIN I SERPL HS-MCNC: 21 NG/L (ref 0–78)
TROPONIN I SERPL HS-MCNC: 23 NG/L (ref 0–78)

## 2024-10-11 PROCEDURE — 97530 THERAPEUTIC ACTIVITIES: CPT

## 2024-10-11 PROCEDURE — 84484 ASSAY OF TROPONIN QUANT: CPT

## 2024-10-11 PROCEDURE — 97110 THERAPEUTIC EXERCISES: CPT

## 2024-10-11 PROCEDURE — 6370000000 HC RX 637 (ALT 250 FOR IP): Performed by: PHYSICIAN ASSISTANT

## 2024-10-11 PROCEDURE — 85014 HEMATOCRIT: CPT

## 2024-10-11 PROCEDURE — 83735 ASSAY OF MAGNESIUM: CPT

## 2024-10-11 PROCEDURE — 82962 GLUCOSE BLOOD TEST: CPT

## 2024-10-11 PROCEDURE — 97535 SELF CARE MNGMENT TRAINING: CPT

## 2024-10-11 PROCEDURE — 85018 HEMOGLOBIN: CPT

## 2024-10-11 PROCEDURE — 97116 GAIT TRAINING THERAPY: CPT

## 2024-10-11 PROCEDURE — 2580000003 HC RX 258: Performed by: PHYSICIAN ASSISTANT

## 2024-10-11 PROCEDURE — 36415 COLL VENOUS BLD VENIPUNCTURE: CPT

## 2024-10-11 RX ORDER — FERROUS SULFATE 325(65) MG
325 TABLET ORAL 2 TIMES DAILY WITH MEALS
Status: DISCONTINUED | OUTPATIENT
Start: 2024-10-11 | End: 2024-10-11 | Stop reason: HOSPADM

## 2024-10-11 RX ADMIN — LISINOPRIL 20 MG: 20 TABLET ORAL at 09:39

## 2024-10-11 RX ADMIN — INSULIN LISPRO 4 UNITS: 100 INJECTION, SOLUTION INTRAVENOUS; SUBCUTANEOUS at 12:25

## 2024-10-11 RX ADMIN — METOPROLOL SUCCINATE 25 MG: 25 TABLET, EXTENDED RELEASE ORAL at 09:39

## 2024-10-11 RX ADMIN — GABAPENTIN 300 MG: 300 CAPSULE ORAL at 09:36

## 2024-10-11 RX ADMIN — POLYETHYLENE GLYCOL 3350 17 G: 17 POWDER, FOR SOLUTION ORAL at 09:40

## 2024-10-11 RX ADMIN — SODIUM CHLORIDE, PRESERVATIVE FREE 10 ML: 5 INJECTION INTRAVENOUS at 09:40

## 2024-10-11 RX ADMIN — SPIRONOLACTONE 12.5 MG: 25 TABLET ORAL at 09:38

## 2024-10-11 RX ADMIN — OXYCODONE 10 MG: 5 TABLET ORAL at 05:44

## 2024-10-11 RX ADMIN — CLONIDINE HYDROCHLORIDE 0.1 MG: 0.1 TABLET ORAL at 09:37

## 2024-10-11 RX ADMIN — INSULIN LISPRO 4 UNITS: 100 INJECTION, SOLUTION INTRAVENOUS; SUBCUTANEOUS at 16:51

## 2024-10-11 RX ADMIN — ISOSORBIDE MONONITRATE 60 MG: 60 TABLET, EXTENDED RELEASE ORAL at 09:38

## 2024-10-11 RX ADMIN — FERROUS SULFATE TAB 325 MG (65 MG ELEMENTAL FE) 325 MG: 325 (65 FE) TAB at 09:38

## 2024-10-11 RX ADMIN — CLOPIDOGREL BISULFATE 75 MG: 75 TABLET ORAL at 09:39

## 2024-10-11 RX ADMIN — BISACODYL 5 MG: 5 TABLET, COATED ORAL at 09:38

## 2024-10-11 RX ADMIN — OXYCODONE 5 MG: 5 TABLET ORAL at 01:29

## 2024-10-11 RX ADMIN — INSULIN LISPRO 8 UNITS: 100 INJECTION, SOLUTION INTRAVENOUS; SUBCUTANEOUS at 09:35

## 2024-10-11 RX ADMIN — TAMSULOSIN HYDROCHLORIDE 0.4 MG: 0.4 CAPSULE ORAL at 09:38

## 2024-10-11 RX ADMIN — OXYCODONE 10 MG: 5 TABLET ORAL at 15:12

## 2024-10-11 RX ADMIN — TOPIRAMATE 50 MG: 25 TABLET, FILM COATED ORAL at 09:38

## 2024-10-11 RX ADMIN — ACETAMINOPHEN 650 MG: 325 TABLET ORAL at 03:58

## 2024-10-11 ASSESSMENT — PAIN SCALES - GENERAL
PAINLEVEL_OUTOF10: 2
PAINLEVEL_OUTOF10: 2
PAINLEVEL_OUTOF10: 7
PAINLEVEL_OUTOF10: 8
PAINLEVEL_OUTOF10: 5
PAINLEVEL_OUTOF10: 6
PAINLEVEL_OUTOF10: 5
PAINLEVEL_OUTOF10: 5

## 2024-10-11 ASSESSMENT — PAIN DESCRIPTION - LOCATION: LOCATION: BACK

## 2024-10-11 ASSESSMENT — PAIN DESCRIPTION - ORIENTATION: ORIENTATION: LOWER

## 2024-10-11 NOTE — PROGRESS NOTES
score and their current functional mobility deficits, it is recommended that the patient have 5-7 sessions per week of Physical Therapy at d/c to increase the patient's independence.  Currently, this patient demonstrates the potential endurance, and/or tolerance for 3 hours of therapy each day at d/c.    Current research shows that an AM-PAC score of 17 (13 without stairs) or less is not associated with a discharge to the patient's home setting. Based on an AM-PAC score and their current functional mobility deficits, it is recommended that the patient have 3-5 sessions per week of Physical Therapy at d/c to increase the patient's independence.     Current research shows that an AM-PAC score of 18 (14 without stairs) or greater is associated with a discharge to the patient's home setting. Based on an AM-PAC score and their current functional mobility deficits, it is recommended that the patient have 2-3 sessions per week of Physical Therapy at d/c to increase the patient's independence.    At this time and based on an AM-PAC score, no further PT is recommended upon discharge due to (i.e. patient at baseline functional status…etc…).  Recommend patient returns to prior setting with prior services.    This [facility] score should be considered in conjunction with interdisciplinary team recommendations to determine the most appropriate discharge setting. Patient's social support, diagnosis, medical stability, and prior level of function should also be taken into consideration.     SUBJECTIVE:   Patient stated, \"Subjective: I guess, since you're here..\"    OBJECTIVE DATA SUMMARY:     Functional Mobility Training:  Bed Mobility:  Bed Mobility Training  Bed Mobility Training: Yes  Interventions: Safety awareness training;Tactile cues;Verbal cues;Visual cues  Supine to Sit: Maximum assistance  Sit to Supine: Maximum assistance;Assist X2  Scooting: Maximum assistance  Transfers:  Transfer Training  Transfer Training: Yes  Sit to  Stand: Moderate assistance;Minimum assistance  Stand to Sit: Minimum assistance  Balance:  Balance  Sitting: Impaired  Sitting - Static: Fair (occasional)  Sitting - Dynamic: Poor (constant support)  Standing: Impaired  Standing - Static: Poor;Constant support  Standing - Dynamic: Poor;Constant support  Therapeutic Exercises:     EXERCISE   Sets   Reps   Active Active Assist   Passive Self ROM   Comments   Ankle Pumps 1 20  [] [] [] []    Quad Sets/Glut Sets    [] [] [] [] Hold for 5 secs   Hamstring Sets   [] [] [] []    Short Arc Quads   [] [] [] []    Heel Slides 1 10 [x] [] [] []    Straight Leg Raises   [] [] [] []    Hip Add 1 10 [x] [] [] [] Hold for 5 secs, w/ pillow squeeze   Long Arc Quads 1 10 [x] [] [] []    Seated Marching   [] [] [] []    Standing Marching   [] [] [] []       [] [] [] []        Pain:  Pain level pre-treatment: 6/10  Pain level post-treatment: 6/10   Pain Intervention(s): Rest, Ice, Repositioning   Response to intervention: Nurse notified    Activity Tolerance:   Fair  Please refer to the flowsheet for vital signs taken during this treatment.  After treatment:   [] Patient left in no apparent distress sitting up in chair  [x] Patient left in no apparent distress in bed  [x] Call bell left within reach  [x] Nursing notified  [] Caregiver present  [] Bed alarm activated  [] SCDs applied      COMMUNICATION/EDUCATION:          Romero Carreon PTA  Minutes: 46

## 2024-10-11 NOTE — PROGRESS NOTES
Report called to Vikram montaño [facility]. All questions answered.

## 2024-10-11 NOTE — PROGRESS NOTES
Progress Note POD #3      Patient: Jj Landon               Sex: male          DOA: 10/8/2024         YOB: 1950      Surgery: Procedure(s):  LUMBAR 2- SACRAL 1 DECOMPRESSION FUSION WITH C-ARM OP 23 \"SPEC POP\"           LOS: 3 days               Subjective:      C/O right thigh pain, pain meds helping. Denies chest pain SOB.     Objective:      Visit Vitals  BP (!) 148/72   Pulse 77   Temp 98 °F (36.7 °C) (Oral)   Resp 16   Ht 1.753 m (5' 9\")   Wt 107.5 kg (236 lb 14.4 oz)   SpO2 99%   BMI 34.98 kg/m²       Physical Exam:  Neurological: motor strength: 5/5 in lower extremities bilaterally                          sensation: intact to light touch  Patient mobility  Functional Mobility Training:  Bed Mobility:  Bed Mobility Training  Bed Mobility Training: Yes  Interventions: Safety awareness training;Tactile cues;Verbal cues;Visual cues  Supine to Sit: Maximum assistance  Sit to Supine: Maximum assistance;Assist X2  Scooting: Maximum assistance  Transfers:  Transfer Training  Transfer Training: Yes  Sit to Stand: Moderate assistance;Minimum assistance  Stand to Sit: Minimum assistance  Balance:  Balance  Sitting: Impaired  Sitting - Static: Fair (occasional)  Sitting - Dynamic: Poor (constant support)  Standing: Impaired  Standing - Static: Poor;Constant support  Standing - Dynamic: Poor;Constant support  Therapeutic Exercises:     EXERCISE   Sets   Reps   Active Active Assist   Passive Self ROM   Comments   Ankle Pumps 1 20  [] []  []  []      Quad Sets/Glut Sets      [] []  []  []  Hold for 5 secs   Hamstring Sets     []  []  []  []      Short Arc Quads     []  []  []  []      Heel Slides 1 10 [x]  []  []  []      Straight Leg Raises     []  []  []  []      Hip Add 1 10 [x]  []  []  []  Hold for 5 secs, w/ pillow squeeze   Long Arc Quads 1 10 [x]  []  []  []      Seated Marching     []  []  []  []      Standing Marching     []  []  []  []            []  []  []  []            Intake and

## 2024-10-11 NOTE — PROGRESS NOTES
Occupational Therapy Goals:  Initiated 10/11/2024 to be met within 7-10 days.  Short Term Goals  Time Frame for Short Term Goals: 7 days  Short Term Goal 1: Patient will complete grooming at sink with supervision  Short Term Goal 2: Patient will complete bathing with supervision while adhering to back precautions  Short Term Goal 3: Patient will complete toileting with supervision  Short Term Goal 4: Patient will complete LBD with supervision and adaptive equipment as needed while adhering to back precautions  Short Term Goal 5: Patient will don/doff LSO with setup A while adhering to back precautions  OCCUPATIONAL THERAPY TREATMENT    Patient: Jj Landon (73 y.o. male)  Date: 10/11/2024  Primary Diagnosis: Spondylolisthesis of lumbar region [M43.16]  Spinal stenosis, lumbar region with neurogenic claudication [M48.062]  Procedure(s) (LRB):  LUMBAR 2- SACRAL 1 DECOMPRESSION FUSION WITH C-ARM OP 23 \"SPEC POP\" (N/A) 3 Days Post-Op   Precautions: Fall Risk (LSO when standing/OOB), Spinal Precautions: No Bending, No Lifting, No Twisting,     ASSESSMENT : Upon arrival pt was semi-reclined in bed with RN at bedside. Pt reports that LSO does not fit. Pt A&Ox4. OT assesses LSO. Dr. Goldman's PA notified that pt's LSO does not fit and Carolee reports that she will bring extender for LSO. Once OT returned to patient's room, pt was seated at EOB with PTA at bedside (tele attached, 1L of 02 via NC, and bilateral gripper socks donned). Pt requesting to sponge bathe and brush teeth. Pt re-educated regarding spinal precautions d/t pt's difficulty recalling. Pt completes UBB with min A and LBB with mod A. Pt demonstrated ability to complete oral hygiene tasks seated at EOB with SBA. Pt donned new gown with min A. Carolee (Dr. Goldman's PA) Dr. Goldman's PA enters with extenders for LSO. OT educates pt on how to don LSO and assists with adjusting extender. Pt then requests sit in arm rest chair to eat the remainder of his lunch. Pt  Mobility Training: No  Interventions: Safety awareness training;Tactile cues;Verbal cues;Visual cues  Rolling: Maximum assistance  Supine to Sit: Maximum assistance  Sit to Supine: Maximum assistance;Assist X2  Scooting: Maximum assistance  Transfers:                 Transfer Training  Transfer Training: Yes  Interventions: Safety awareness training;Verbal cues  Sit to Stand: Minimum assistance  Stand to Sit: Minimum assistance  Bed to Chair: Minimum assistance    ADL Assessment:   Feeding: Setup  Grooming: Stand by assistance  UE Bathing: Stand by assistance  LE Bathing: Moderate assistance  UE Dressing: Minimal assistance                   ADL Intervention:      Therapeutic Exercise/Neuromuscular Re-education:      Pain:  Pain level pre-treatment: 4-5/10   Pain level post-treatment: 4-5/10   Pain Intervention(s): Rest, Repositioning   Response to intervention: Nurse notified regarding pt's performance with therapy.     Activity Tolerance:   Activity Tolerance: Patient limited by pain, Treatment limited secondary to decreased cognition  Please refer to the flowsheet for vital signs taken during this treatment.    After treatment:   [x] Patient left in no apparent distress sitting up in chair  [] Patient left in no apparent distress in bed  [x] Call bell left within reach  [x] Nursing notified  [] Caregiver present  [] Bed alarm activated    COMMUNICATION/EDUCATION:   Patient Education  Education Given To: Patient  Education Provided: Role of Therapy;Transfer Training;Plan of Care;ADL Adaptive Strategies;Mobility Training;Fall Prevention Strategies;Precautions;Equipment  Education Method: Verbal  Barriers to Learning: Cognition  Education Outcome: Continued education needed    Thank you for this referral.  Mariela Begum OTR/L  Minutes: 58    Eval Complexity: Decision Making: Medium Complexity

## 2024-10-11 NOTE — CARE COORDINATION
This CM met with the patient at the bedside to discuss SNF options. [facility]OC was offered and a SNF list was given to the patient. [facility]     [facility] has accepted patient and patient to transfer today. SW to reach out to [facility] for a time of transfer. Spouse aware and agreeable with DC plan.     [facility]    Address: [address] [place] [address], [place] [address]    Phone: [phone]

## 2024-10-11 NOTE — PLAN OF CARE
Problem: Safety - Adult  Goal: Free from fall injury  10/11/2024 1142 by Jeanette Vera RN  Outcome: Progressing  10/11/2024 0031 by Julian Martins RN  Outcome: Progressing     Problem: Pain  Goal: Verbalizes/displays adequate comfort level or baseline comfort level  10/11/2024 1142 by Jeanette Vera RN  Outcome: Progressing  10/11/2024 0031 by Julian Martins RN  Outcome: Progressing  Flowsheets (Taken 10/10/2024 2000)  Verbalizes/displays adequate comfort level or baseline comfort level:   Encourage patient to monitor pain and request assistance   Administer analgesics based on type and severity of pain and evaluate response   Assess pain using appropriate pain scale   Implement non-pharmacological measures as appropriate and evaluate response     Problem: Chronic Conditions and Co-morbidities  Goal: Patient's chronic conditions and co-morbidity symptoms are monitored and maintained or improved  10/11/2024 1142 by Jeanette Vera RN  Outcome: Progressing  10/11/2024 0031 by Julian Martins RN  Outcome: Progressing  Flowsheets (Taken 10/10/2024 2000)  Care Plan - Patient's Chronic Conditions and Co-Morbidity Symptoms are Monitored and Maintained or Improved:   Monitor and assess patient's chronic conditions and comorbid symptoms for stability, deterioration, or improvement   Collaborate with multidisciplinary team to address chronic and comorbid conditions and prevent exacerbation or deterioration   Update acute care plan with appropriate goals if chronic or comorbid symptoms are exacerbated and prevent overall improvement and discharge     Problem: Discharge Planning  Goal: Discharge to home or other facility with appropriate resources  10/11/2024 1142 by Jeanette Vera RN  Outcome: Progressing  10/11/2024 0031 by Julian Martins RN  Outcome: Progressing  Flowsheets (Taken 10/10/2024 2000)  Discharge to home or other facility with appropriate resources:   Identify barriers to discharge with patient and

## 2024-10-11 NOTE — PROGRESS NOTES
Hospitalist Progress Note    Patient: Jj Landon MRN: [id number]  CSN: [id number]    YOB: 1950  Age: 73 y.o.  Sex: male    DOA: 10/8/2024 LOS:  LOS: 3 days         Total duration of encounter: 3 days      Chief Complaint:  Pt was admitted for spine surgery  and rrt called on 10/10 due to syncope episodes     Subjective:  I feel fine, no chest pain, no sob     Review of systems:  Constitutional: denies fevers, chills, myalgias  Respiratory: denies SOB, cough  Cardiovascular: denies chest pain, palpitations  Gastrointestinal: denies nausea, vomiting, diarrhea    10 systems reviewed, all negative other than what is mentioned above.      Vital signs/Intake and Output:  Visit Vitals  /61   Pulse 81   Temp 98.3 °F (36.8 °C) (Oral)   Resp 16   Ht 1.753 m (5' 9\")   Wt 107.5 kg (236 lb 14.4 oz)   SpO2 100%   BMI 34.98 kg/m²     Current Shift:  No intake/output data recorded.  Last three shifts:  10/09 1901 - 10/11 0700  In: 2142.3 [P.O.:545; I.V.:1597.3]  Out: 2970 [Urine:2650; Drains:320]    Exam:    General: Well developed, alert, NAD, OX3  Head/Neck: NCAT, supple, No masses, No lymphadenopathy  CVS:Regular rate and rhythm, no M/R/G, S1/S2 heard, no thrill  Lungs:Clear to auscultation bilaterally, no wheezes, rhonchi, or rales  Abdomen: Soft, Nontender, No distention, Normal Bowel sounds, No hepatomegaly  Extremities: No C/C/E, pulses palpable 2+  Skin:normal texture and turgor, no rashes, no lesions  Neuro:grossly normal , follows commands  Psych:appropriate    Labs: Results:       Chemistry Recent Labs     10/10/24  1009   GLUCOSE 249*      K 4.4      CO2 26   BUN 20*   CREATININE 1.84*   CALCIUM 8.3*   BILITOT 0.4   AST 41*   ALT 15*   ALKPHOS 105   GLOB 3.3   LABGLOM 38*      CBC w/Diff Recent Labs     10/10/24  0217 10/10/24  1009 10/11/24  0050   WBC  --  12.2  --    RBC  --  3.69*  --    HGB 10.5* 11.3* 10.9*   HCT 32.0* 34.5* 33.9*   PLT  --  255  --    LYMPHOPCT  --  13*  --        Cardiac Enzymes No results for input(s): \"CKTOTAL\", \"CKMB\", \"CKMBINDEX\", \"TROPONINI\" in the last 72 hours.    Invalid input(s): \"TERRENCE\"   Coagulation No results for input(s): \"PROTIME\", \"INR\", \"APTT\" in the last 72 hours.    Lipid Panel No results found for: \"CHOL\", \"TRIG\", \"HDL\", \"VLDL\", \"CHOLHDLRATIO\"   BNP No results for input(s): \"BNP\" in the last 72 hours.   Liver Enzymes Recent Labs     10/10/24  1009   ALT 15*   AST 41*   ALKPHOS 105   BILITOT 0.4      Thyroid Studies No results found for: \"T2EUWPG\", \"FT3\", \"T4FREE\", \"TSH\"       Procedures/imaging: see electronic medical records for all procedures/Xrays and details which were not copied into this note but were reviewed prior to creation of Plan         Assessment/Plan     Principal Problem (Resolved):    Spinal stenosis, lumbar region with neurogenic claudication  Active Problems:    Syncope    CAD (coronary artery disease)    Stage 3 chronic kidney disease (HCC)    Type 2 diabetes mellitus (HCC)    Iron deficiency anemia    NEGRITO (obstructive sleep apnea)     Syncope   No more episode  All work up negative   Like due to vaso-vagal     DM continue current regimen     Iron deficiency   Hh stable ,  continue iron supplement       Negrito not on cpap   Abg is good     Htn continue current regimen     Ready to go to rehab     Discharge to; , [] Home  [x]SNF/Rehab  []  Others  in  0 Days, [x]  stable for DC         Case discussed with:  [x]Patient  []Family  [x]Nursing  [x]Case Management [] Consultants  DVT Prophylaxis:  []Lovenox  []Hep SQ  [x]SCDs  []Coumadin, Eliquis, Xarelto, Pradaxa   []On Heparin gtt.     TIME: 55 minutes were spent on the care of this patient today,  This time also includes physician non-face-to-face service time visit on the date of service such as  Preparing to see the patient (eg, review of tests)  Obtaining and/or reviewing separately obtained history  Performing a medically necessary appropriate examination and/or evaluation  Counseling

## 2024-10-11 NOTE — PLAN OF CARE
Problem: Safety - Adult  Goal: Free from fall injury  Outcome: Progressing     Problem: Pain  Goal: Verbalizes/displays adequate comfort level or baseline comfort level  Outcome: Progressing  Flowsheets (Taken 10/10/2024 2000)  Verbalizes/displays adequate comfort level or baseline comfort level:   Encourage patient to monitor pain and request assistance   Administer analgesics based on type and severity of pain and evaluate response   Assess pain using appropriate pain scale   Implement non-pharmacological measures as appropriate and evaluate response     Problem: Chronic Conditions and Co-morbidities  Goal: Patient's chronic conditions and co-morbidity symptoms are monitored and maintained or improved  Outcome: Progressing  Flowsheets (Taken 10/10/2024 2000)  Care Plan - Patient's Chronic Conditions and Co-Morbidity Symptoms are Monitored and Maintained or Improved:   Monitor and assess patient's chronic conditions and comorbid symptoms for stability, deterioration, or improvement   Collaborate with multidisciplinary team to address chronic and comorbid conditions and prevent exacerbation or deterioration   Update acute care plan with appropriate goals if chronic or comorbid symptoms are exacerbated and prevent overall improvement and discharge     Problem: Discharge Planning  Goal: Discharge to home or other facility with appropriate resources  Outcome: Progressing  Flowsheets (Taken 10/10/2024 2000)  Discharge to home or other facility with appropriate resources:   Identify barriers to discharge with patient and caregiver   Identify discharge learning needs (meds, wound care, etc)     Problem: ABCDS Injury Assessment  Goal: Absence of physical injury  Outcome: Progressing     Problem: Skin/Tissue Integrity  Goal: Absence of new skin breakdown  Description: 1.  Monitor for areas of redness and/or skin breakdown  2.  Assess vascular access sites hourly  3.  Every 4-6 hours minimum:  Change oxygen saturation

## 2024-10-11 NOTE — PROGRESS NOTES
[]  Patient has met MD cata metzger for d/c home   []  HH with 24 hour adult care   [x]  Benefit from additional acute PT session to address:        PHYSICAL THERAPY TREATMENT    Patient: Jj Landon (73 y.o. male)  Date: 10/11/2024  Diagnosis: Spondylolisthesis of lumbar region [M43.16]  Spinal stenosis, lumbar region with neurogenic claudication [M48.062] Spinal stenosis, lumbar region with neurogenic claudication  Procedure(s) (LRB):  LUMBAR 2- SACRAL 1 DECOMPRESSION FUSION WITH C-ARM OP 23 \"SPEC POP\" (N/A) 3 Days Post-Op  Precautions: Fall Risk (LSO when standing/OOB),  ,  ,  ,  , Spinal Precautions: No Bending, No Lifting, No Twisting,  ,        ASSESSMENT:  Introduced self to pt and family and pt agreeable to PT treatment. Pt is more alert. RN present and requesting assistance with positioning for drain delete. Pt continues to require reinforcement for log rolling and reduction of twisting. Due to lack of bowel output, brace still not able to be fastened. Ortho cleared  for mobility without brace, until extension received. Pt is able ot increase to numerous trials of standing marches and light forward/backward amb. Exercises fairly tolerate and exceeding expectation. Pt trials 20 min of sitting in chair without event. Situation is improving, but will need more T before home.     Progression toward goals: Fair-  [x]      Improving appropriately and progressing toward goals  []      Improving slowly and progressing toward goals  []      Not making progress toward goals and plan of care will be adjusted     PLAN:  Patient continues to benefit from skilled intervention to address the above impairments.  Continue treatment per established plan of care.    Further Equipment Recommendations for Discharge: gait belt and rolling walker    [facility]:   AM-PAC Inpatient Mobility without Stair Climbing Raw Score : 8    Current research shows that an AM-PAC score of 17 (13 without stairs) or less is not associated

## 2024-10-13 LAB
EKG ATRIAL RATE: 74 BPM
EKG DIAGNOSIS: NORMAL
EKG P AXIS: 15 DEGREES
EKG P-R INTERVAL: 198 MS
EKG Q-T INTERVAL: 376 MS
EKG QRS DURATION: 86 MS
EKG QTC CALCULATION (BAZETT): 417 MS
EKG R AXIS: 56 DEGREES
EKG T AXIS: 5 DEGREES
EKG VENTRICULAR RATE: 74 BPM

## (undated) DEVICE — SYR 3ML LL TIP 1/10ML GRAD --

## (undated) DEVICE — TRAP SPEC COLL POLYP POLYSTYR --

## (undated) DEVICE — ALL PURPOSE SPONGES,NON-WOVEN, 4 PLY: Brand: CURITY

## (undated) DEVICE — SUTURE ABSORBABLE BRAIDED 1-0 OS-8 CR 3X18 IN UD VICRYL J757T

## (undated) DEVICE — BRUSH CYTO L240CM DIA2.3MM GI COLONOSCOPY 3 RNG HNDL RADPQ

## (undated) DEVICE — SNARE POLYP SM W13MMXL240CM SHTH DIA2.4MM OVL FLX DISP

## (undated) DEVICE — NDL PRT INJ NSAF BLNT 18GX1.5 --

## (undated) DEVICE — SHEET,DRAPE,40X58,STERILE: Brand: MEDLINE

## (undated) DEVICE — SOLUTION SURG PREP 26 CC PURPREP

## (undated) DEVICE — SINGLE PORT MANIFOLD: Brand: NEPTUNE 2

## (undated) DEVICE — Device

## (undated) DEVICE — 3.0MM PRECISION NEURO (MATCH HEAD)

## (undated) DEVICE — CATH IV SAFE STR 22GX1IN BLU -- PROTECTIV PLUS

## (undated) DEVICE — AGENT HEMOSTATIC SURGIFLOW MATRIX KIT W/THROMBIN

## (undated) DEVICE — DRESSING STERILE PETRO W3XL8IN N ADH OIL EMUL GZ CURAD

## (undated) DEVICE — SHEET,DRAPE,70X100,STERILE: Brand: MEDLINE

## (undated) DEVICE — HANDPIECE SET WITH HIGH FLOW TIP AND SUCTION TUBE: Brand: INTERPULSE

## (undated) DEVICE — GLOVE SURG SZ 65 THK91MIL LTX FREE SYN POLYISOPRENE

## (undated) DEVICE — TRNQT TEXT 1X18IN BLU LF DISP -- CONVERT TO ITEM 362165

## (undated) DEVICE — SPONGE,DISSECTOR,ROUND CHERRY,XR,ST,5/PK: Brand: MEDLINE

## (undated) DEVICE — SYRINGE MED 50ML LUERLOCK TIP

## (undated) DEVICE — AIRLIFE™ NASAL OXYGEN CANNULA CURVED, FLARED TIP WITH 14 FOOT (4.3 M) CRUSH-RESISTANT TUBING, OVER-THE-EAR STYLE: Brand: AIRLIFE™

## (undated) DEVICE — ENDO CARRY-ON PROCEDURE KIT INCLUDES ENZYMATIC SPONGE, GAUZE, BIOHAZARD LABEL, TRAY, LUBRICANT, DIRTY SCOPE LABEL, WATER LABEL, TRAY, DRAWSTRING PAD, AND DEFENDO 4-PIECE KIT.: Brand: ENDO CARRY-ON PROCEDURE KIT

## (undated) DEVICE — BIPOLAR SEALER 23-121-1 AQM EVS: Brand: AQUAMANTYS™

## (undated) DEVICE — SET ADMIN 16ML TBNG L100IN 2 Y INJ SITE IV PIGGY BK DISP

## (undated) DEVICE — FORCEPS BX CAP 240CM L RAD JAW 4

## (undated) DEVICE — KIT EVAC 0.13IN RECT TB DIA10FR 400CC PVC 3 SPR Y CONN DRN

## (undated) DEVICE — 1LYRTR 16FR10ML 100%SILI SNAP: Brand: MEDLINE INDUSTRIES, INC.

## (undated) DEVICE — MOUTHPIECE ENDOSCP 20X27MM --

## (undated) DEVICE — SOLUTION IRRIG 500ML 0.9% SOD CHLO USP POUR PLAS BTL

## (undated) DEVICE — EJECTOR SALIVA 6 IN FLX CLR

## (undated) DEVICE — SYR 50ML SLIP TIP NSAF LF STRL --

## (undated) DEVICE — SYRINGE IRRIG 60ML SFT PLIABLE BLB EZ TO GRP 1 HND USE W/

## (undated) DEVICE — GLOVE ORANGE PI 8 1/2   MSG9085

## (undated) DEVICE — KENDALL RADIOLUCENT FOAM MONITORING ELECTRODE RECTANGULAR SHAPE: Brand: KENDALL

## (undated) DEVICE — NEEDLE HYPO 18GA L1.5IN PNK POLYPR HUB S STL REG BVL STR

## (undated) DEVICE — SOLUTION IV 100ML 0.9% SOD CHL PLAS CONT USP VIAFLX 1 PER

## (undated) DEVICE — 4-PORT MANIFOLD: Brand: NEPTUNE 2

## (undated) DEVICE — GAUZE,SPONGE,8"X4",12PLY,XRAY,STRL,LF: Brand: MEDLINE

## (undated) DEVICE — SOLUTION IV LACTATED RINGERS INJECTION USP

## (undated) DEVICE — SUTURE VICRYL + SZ 2-0 L18IN ABSRB VLT CT-2 1/2 CIR TAPERCUT VCP726D

## (undated) DEVICE — PAD,ABDOMINAL,5"X9",ST,LF,25/BX: Brand: MEDLINE INDUSTRIES, INC.

## (undated) DEVICE — ELECTRODE PT RET AD L9FT HI MOIST COND ADH HYDRGEL CORDED

## (undated) DEVICE — GLOVE ORANGE PI 8   MSG9080

## (undated) DEVICE — GLOVE ORANGE PI 7   MSG9070

## (undated) DEVICE — MAJ-1414 SINGLE USE ADPATER BIOPSY VALV: Brand: SINGLE USE ADAPTOR BIOPSY VALVE